# Patient Record
Sex: FEMALE | Race: WHITE | NOT HISPANIC OR LATINO | URBAN - METROPOLITAN AREA
[De-identification: names, ages, dates, MRNs, and addresses within clinical notes are randomized per-mention and may not be internally consistent; named-entity substitution may affect disease eponyms.]

---

## 2020-09-25 ENCOUNTER — EMERGENCY (EMERGENCY)
Facility: HOSPITAL | Age: 70
LOS: 0 days | Discharge: HOME | End: 2020-09-25
Payer: MEDICARE

## 2020-09-25 VITALS
RESPIRATION RATE: 17 BRPM | HEIGHT: 67 IN | HEART RATE: 85 BPM | DIASTOLIC BLOOD PRESSURE: 93 MMHG | WEIGHT: 169.98 LBS | TEMPERATURE: 98 F | OXYGEN SATURATION: 97 % | SYSTOLIC BLOOD PRESSURE: 129 MMHG

## 2020-09-25 DIAGNOSIS — M25.461 EFFUSION, RIGHT KNEE: ICD-10-CM

## 2020-09-25 DIAGNOSIS — Z88.0 ALLERGY STATUS TO PENICILLIN: ICD-10-CM

## 2020-09-25 DIAGNOSIS — M25.561 PAIN IN RIGHT KNEE: ICD-10-CM

## 2020-09-25 DIAGNOSIS — Z96.651 PRESENCE OF RIGHT ARTIFICIAL KNEE JOINT: ICD-10-CM

## 2020-09-25 LAB
ALBUMIN SERPL ELPH-MCNC: 4.2 G/DL — SIGNIFICANT CHANGE UP (ref 3.3–5)
ALP SERPL-CCNC: 68 U/L — SIGNIFICANT CHANGE UP (ref 40–120)
ALT FLD-CCNC: 23 U/L — SIGNIFICANT CHANGE UP (ref 12–78)
ANION GAP SERPL CALC-SCNC: 5 MMOL/L — SIGNIFICANT CHANGE UP (ref 5–17)
AST SERPL-CCNC: 30 U/L — SIGNIFICANT CHANGE UP (ref 15–37)
BASOPHILS # BLD AUTO: 0.04 K/UL — SIGNIFICANT CHANGE UP (ref 0–0.2)
BASOPHILS NFR BLD AUTO: 0.4 % — SIGNIFICANT CHANGE UP (ref 0–2)
BILIRUB SERPL-MCNC: 0.4 MG/DL — SIGNIFICANT CHANGE UP (ref 0.2–1.2)
BUN SERPL-MCNC: 14 MG/DL — SIGNIFICANT CHANGE UP (ref 7–23)
CALCIUM SERPL-MCNC: 9.9 MG/DL — SIGNIFICANT CHANGE UP (ref 8.5–10.1)
CHLORIDE SERPL-SCNC: 108 MMOL/L — SIGNIFICANT CHANGE UP (ref 96–108)
CO2 SERPL-SCNC: 28 MMOL/L — SIGNIFICANT CHANGE UP (ref 22–31)
CREAT SERPL-MCNC: 0.68 MG/DL — SIGNIFICANT CHANGE UP (ref 0.5–1.3)
CRP SERPL-MCNC: 0.22 MG/DL — SIGNIFICANT CHANGE UP (ref 0–0.4)
EOSINOPHIL # BLD AUTO: 0.13 K/UL — SIGNIFICANT CHANGE UP (ref 0–0.5)
EOSINOPHIL NFR BLD AUTO: 1.4 % — SIGNIFICANT CHANGE UP (ref 0–6)
ERYTHROCYTE [SEDIMENTATION RATE] IN BLOOD: 7 MM/HR — SIGNIFICANT CHANGE UP (ref 0–20)
GLUCOSE SERPL-MCNC: 96 MG/DL — SIGNIFICANT CHANGE UP (ref 70–99)
GRAM STN FLD: SIGNIFICANT CHANGE UP
HCT VFR BLD CALC: 44.2 % — SIGNIFICANT CHANGE UP (ref 34.5–45)
HGB BLD-MCNC: 14.8 G/DL — SIGNIFICANT CHANGE UP (ref 11.5–15.5)
IMM GRANULOCYTES NFR BLD AUTO: 0.2 % — SIGNIFICANT CHANGE UP (ref 0–1.5)
LYMPHOCYTES # BLD AUTO: 1.7 K/UL — SIGNIFICANT CHANGE UP (ref 1–3.3)
LYMPHOCYTES # BLD AUTO: 18.9 % — SIGNIFICANT CHANGE UP (ref 13–44)
MCHC RBC-ENTMCNC: 30.8 PG — SIGNIFICANT CHANGE UP (ref 27–34)
MCHC RBC-ENTMCNC: 33.5 GM/DL — SIGNIFICANT CHANGE UP (ref 32–36)
MCV RBC AUTO: 92.1 FL — SIGNIFICANT CHANGE UP (ref 80–100)
MONOCYTES # BLD AUTO: 0.58 K/UL — SIGNIFICANT CHANGE UP (ref 0–0.9)
MONOCYTES NFR BLD AUTO: 6.5 % — SIGNIFICANT CHANGE UP (ref 2–14)
NEUTROPHILS # BLD AUTO: 6.51 K/UL — SIGNIFICANT CHANGE UP (ref 1.8–7.4)
NEUTROPHILS NFR BLD AUTO: 72.6 % — SIGNIFICANT CHANGE UP (ref 43–77)
NRBC # BLD: 0 /100 WBCS — SIGNIFICANT CHANGE UP (ref 0–0)
PLATELET # BLD AUTO: 336 K/UL — SIGNIFICANT CHANGE UP (ref 150–400)
POTASSIUM SERPL-MCNC: 4.3 MMOL/L — SIGNIFICANT CHANGE UP (ref 3.5–5.3)
POTASSIUM SERPL-SCNC: 4.3 MMOL/L — SIGNIFICANT CHANGE UP (ref 3.5–5.3)
PROT SERPL-MCNC: 8.2 GM/DL — SIGNIFICANT CHANGE UP (ref 6–8.3)
RBC # BLD: 4.8 M/UL — SIGNIFICANT CHANGE UP (ref 3.8–5.2)
RBC # FLD: 12.9 % — SIGNIFICANT CHANGE UP (ref 10.3–14.5)
SODIUM SERPL-SCNC: 141 MMOL/L — SIGNIFICANT CHANGE UP (ref 135–145)
SPECIMEN SOURCE: SIGNIFICANT CHANGE UP
WBC # BLD: 8.98 K/UL — SIGNIFICANT CHANGE UP (ref 3.8–10.5)
WBC # FLD AUTO: 8.98 K/UL — SIGNIFICANT CHANGE UP (ref 3.8–10.5)

## 2020-09-25 PROCEDURE — 99284 EMERGENCY DEPT VISIT MOD MDM: CPT

## 2020-09-25 PROCEDURE — 73562 X-RAY EXAM OF KNEE 3: CPT | Mod: 26,RT

## 2020-09-25 PROCEDURE — 73700 CT LOWER EXTREMITY W/O DYE: CPT | Mod: 26,RT

## 2020-09-25 RX ORDER — KETOROLAC TROMETHAMINE 30 MG/ML
15 SYRINGE (ML) INJECTION ONCE
Refills: 0 | Status: DISCONTINUED | OUTPATIENT
Start: 2020-09-25 | End: 2020-09-25

## 2020-09-25 RX ORDER — DIAZEPAM 5 MG
2 TABLET ORAL ONCE
Refills: 0 | Status: DISCONTINUED | OUTPATIENT
Start: 2020-09-25 | End: 2020-09-25

## 2020-09-25 RX ADMIN — Medication 2 MILLIGRAM(S): at 16:43

## 2020-09-25 RX ADMIN — Medication 15 MILLIGRAM(S): at 15:05

## 2020-09-25 RX ADMIN — Medication 15 MILLIGRAM(S): at 16:44

## 2020-09-25 NOTE — CONSULT NOTE ADULT - ASSESSMENT
70F with severe R knee pain s/p R TKA 3 years ago with Dr Jimenez, likely secondary to a traumatic effusion     -FU aspiration fluid for cell count, culture, gram stain, and crystals   -ESR: 7, FU CRP   -Pain control  -Ace wrap for comfort   -WBAT of the RLE  -Will discuss with Dr Ga and advise if plan changes     INCOMPLETE******** 70F with severe R knee pain s/p R TKA 3 years ago with Dr Jimenez, likely secondary to a traumatic effusion     -FU aspiration fluid for cell count, culture, gram stain, and crystals   -ESR: 7, CRP 0.22 ----- indicates low suspicion for infected total knee joint   -Pain control  -Ace wrap for comfort   -WBAT of the RLE  -We will monitor aspiration labs for evidence of infection and contact the patient if such labs result   -Orthopaedically stable for discharge  -Follow up w/ Dr. Mao as planned on October 2nd  -Discharge planning  -All patient's questions answered. Patient understands and agrees w/ above plan.  -Discussed with Dr Ga who agrees with the plan

## 2020-09-25 NOTE — ED PROVIDER NOTE - CLINICAL SUMMARY MEDICAL DECISION MAKING FREE TEXT BOX
69 yo  female with h/o right total knee replacement (2 yrs ago) presents to the ED c/o right knee pain intermittent since surgery however pain significantly worse since yesterday. Surgery was done by DR. Abel (2 yrs ago). A/P: knee xray, labs/esr/crp, ortho consult, nsaids for pain, reassess. 69 yo  female with h/o right total knee replacement (2 yrs ago) presents to the ED c/o right knee pain intermittent since surgery however pain significantly worse since yesterday. Surgery was done by DR. Abel (2 yrs ago). A/P: knee xray, labs/esr/crp, ortho consult, nsaids for pain, reassess. Cleared by ortho for discharge. Will follow up with ortho outpatient. Pain improved after joint aspiration, patient ambulating with knee immobilizer.

## 2020-09-25 NOTE — ED ADULT TRIAGE NOTE - CHIEF COMPLAINT QUOTE
69 y/o of female with PMH of right knee implant in 2017. Presents to the ed with chronic right knee swelling, pain and difficulty walking. The pain radiates down the foot area.

## 2020-09-25 NOTE — ED ADULT NURSE NOTE - CHIEF COMPLAINT QUOTE
71 y/o of female with PMH of right knee implant in 2017. Presents to the ed with chronic right knee swelling, pain and difficulty walking. The pain radiates down the foot area.

## 2020-09-25 NOTE — ED PROVIDER NOTE - PATIENT PORTAL LINK FT
You can access the FollowMyHealth Patient Portal offered by [facility] by registering at the following website: [email or web address]. By joining [facility]’s FollowMyHealth portal, you will also be able to view your health information using other applications (apps) compatible with our system.

## 2020-09-25 NOTE — ED PROVIDER NOTE - CARE PROVIDER_API CALL
Howard Mao  ORTHOPAEDIC SURGERY  [address], [address] [place] [address]  Phone: [phone]  Fax: [phone]  Follow Up Time: 1-3 Days

## 2020-09-25 NOTE — CONSULT NOTE ADULT - SUBJECTIVE AND OBJECTIVE BOX
70yFemale c/o R kneee pain for 2 years duration follow a TKA with Dr Jimenez, however the pain has severely worsen in the last couple days. Patient states that they cannot walk because of pain. Patient states she has some chronic numbness and tingling over the lateral aspect of the thigh and knee. Otherwise patient denies numbness or tingling in the RLE. Patient denies hx of trauma. Patient denies fever/chills. She was a patient of Dr Crouch however has an appointment to start care with Dr Mao on the 2nd of October.     PAST MEDICAL & SURGICAL HISTORY:  No pertinent past medical history      Vital Signs Last 24 Hrs  T(C): 36.6 (25 Sep 2020 12:11), Max: 36.6 (25 Sep 2020 12:11)  T(F): 97.9 (25 Sep 2020 12:11), Max: 97.9 (25 Sep 2020 12:11)  HR: 85 (25 Sep 2020 12:11) (85 - 85)  BP: 129/93 (25 Sep 2020 12:11) (129/93 - 129/93)  BP(mean): --  RR: 17 (25 Sep 2020 12:11) (17 - 17)  SpO2: 97% (25 Sep 2020 12:11) (97% - 97%)    Gen: NAD    PE RLE:  Skin intact,   +warmth of knee and effusion, no erythema   SILT L3-S1  +EHL/FHL/TA/Gastroc,   +hip/ankle ROM,   knee ROM limited 2/2 pain, patient is able to A/PROM of the knee about 15 degrees before pain is too severe  DP+, soft compartments, no calf ttp.    Secondary Survey:   No TTP over bony prominences, SILT, palpable pulses, full/painless A/PROM, compartments soft. No TTP over spinous processes or paraspinal Procedure:  The benefits and risks of joint aspiration were explained to the patient, whom agreed to proceed with aspiration. Under aspetic conditions, ___ cc of <DESCRIPTION OF FLUID> fluid was removed from the affected joint. This fluid was distributed to a sterile urine cup for gram stain and cell culture, a lavender top laboratory tube for cell count, and a red top laboratory tube for crystal analysis. The patient tolerated the procedure well and there were no complications. Patient was neurovascularly intact after the procedure.les at C/T/L spine. No palpable step off. No other injuries or complaints.    Imaging:  XR R knee demonstrating no acute fracture or dislocation of the TKA. Appears to be in good alignment and no obvious loosening is present  CT of the Right Knee demonstrates a large effusion of the knee joint     Procedure:  The benefits and risks of joint aspiration were explained to the patient, whom agreed to proceed with aspiration. Under aspetic conditions, 80 cc of bloody fluid was removed from the affected joint. This fluid was distributed to a sterile urine cup for gram stain and cell culture, a lavender top laboratory tube for cell count, and a red top laboratory tube for crystal analysis. The patient tolerated the procedure well and there were no complications. Patient was neurovascularly intact after the procedure.   70yFemale c/o R knee pain for 2 years duration follow a TKA with Dr Jimenez, however the pain has severely worsened in the last couple days. Patient states that they cannot walk because of pain. Patient states she has some chronic numbness and tingling over the lateral aspect of the thigh and knee. Otherwise patient denies numbness or tingling in the RLE. Patient denies hx of trauma. Patient denies fever/chills. Pt states she sometimes feels like her knee "bends backwards" when she walks and this has happened on multiple occasions. She was a patient of Dr Crouch however has an appointment to start care with Dr Mao on the 2nd of October.     PAST MEDICAL & SURGICAL HISTORY:  No pertinent past medical history  R TKA in 2018 by Dr. Jimenez      Vital Signs Last 24 Hrs  T(C): 36.6 (25 Sep 2020 12:11), Max: 36.6 (25 Sep 2020 12:11)  T(F): 97.9 (25 Sep 2020 12:11), Max: 97.9 (25 Sep 2020 12:11)  HR: 85 (25 Sep 2020 12:11) (85 - 85)  BP: 129/93 (25 Sep 2020 12:11) (129/93 - 129/93)  BP(mean): --  RR: 17 (25 Sep 2020 12:11) (17 - 17)  SpO2: 97% (25 Sep 2020 12:11) (97% - 97%)    Gen: NAD    PE RLE:  Skin intact,   +warmth of knee and effusion, no erythema   SILT L3-S1  +EHL/FHL/TA/Gastroc,   +hip/ankle ROM,   knee ROM limited 2/2 pain, patient is able to A/PROM of the knee about 15 degrees before pain is too severe  DP+, soft compartments, no calf ttp.    Secondary Survey:   No TTP over bony prominences, SILT, palpable pulses, full/painless A/PROM, compartments soft. No TTP over spinous processes or paraspinal muscles at C/T/L spine. No palpable step off. No other injuries or complaints.    Imaging:  XR R knee demonstrating no acute fracture or dislocation of the TKA. Appears to be in good alignment and no obvious loosening is present  CT of the Right Knee demonstrates a large effusion of the knee joint     Procedure:  The benefits and risks of joint aspiration were explained to the patient, whom agreed to proceed with aspiration. Under aseptic conditions, 80 cc of bloody fluid was removed from the affected joint. This fluid was distributed to a sterile urine cup for gram stain and cell culture, a lavender top laboratory tube for cell count, and a red top laboratory tube for crystal analysis. The patient tolerated the procedure well and there were no complications. The knee was dressed with 4x4s and ACE. Patient was neurovascularly intact after the procedure.

## 2020-09-25 NOTE — ED PROVIDER NOTE - OBJECTIVE STATEMENT
71 yo  female with h/o right total knee replacement (2 yrs ago) presents to the ED c/o right knee pain intermittent since surgery however pain significantly worse since yesterday. Surgery was done by DR. Abel (2 yrs ago). however patient has appointment with new orthopedists, DR. Knott on Oct 2nd for knee pain. No fall or trauma. Patient unable to put weight on knee due to pain. Denies fever, chills, chest pain, sob, abd pain, N/V, LE weakness or paresthesias. no h/o DM.

## 2020-09-25 NOTE — ED PROVIDER NOTE - PROGRESS NOTE DETAILS
Patient evaluated by ortho, joint aspiration performed - sent to lab (pending results bc specimen was sent to [facility]). Cleared by ortho for discharge, low concern for septic joint given normal esr and crp. Will follow up with outpatient ortho.

## 2020-09-25 NOTE — ED PROVIDER NOTE - SKIN, MLM
Skin normal color for race, warm, dry and intact. No evidence of rash. no erythema  or rash to right knee. + mild increased warmth to knee. cap refill < 2 sec

## 2020-09-25 NOTE — ED PROVIDER NOTE - MUSCULOSKELETAL MINIMAL EXAM
+ TTP right anterior knee joint with effusion, no erythema. + mild increased warmth. Decreased ROM of right knee due to pain. dp pulses equal and intact bilaterally.

## 2020-09-25 NOTE — ED ADULT NURSE NOTE - OBJECTIVE STATEMENT
Pt is a 70YOF who is here with pain in her right knee, pt has some edema, no redness noted, no warmth to the site, pt has limited ROM/PROM, pt is not ambulatory, pt has pain in the site and states that she had a total knee replacement in 2018 on that knee. Pt states she has had pain in her knee since. Pt states that she has had no falls, no trauma and recent injury.

## 2020-09-25 NOTE — ED PROVIDER NOTE - NSFOLLOWUPINSTRUCTIONS_ED_ALL_ED_FT

## 2020-09-26 LAB
B PERT IGG+IGM PNL SER: ABNORMAL
COLOR FLD: SIGNIFICANT CHANGE UP
EOSINOPHIL # FLD: 5 % — SIGNIFICANT CHANGE UP
FLUID INTAKE SUBSTANCE CLASS: SIGNIFICANT CHANGE UP
FLUID SEGMENTED GRANULOCYTES: 37 % — SIGNIFICANT CHANGE UP
LYMPHOCYTES # FLD: 31 % — SIGNIFICANT CHANGE UP
MESOTHL CELL # FLD: 1 % — SIGNIFICANT CHANGE UP
MONOS+MACROS # FLD: 26 % — SIGNIFICANT CHANGE UP
RCV VOL RI: HIGH /UL (ref 0–0)
SYNOVIAL CRYSTALS CLARITY: ABNORMAL
SYNOVIAL CRYSTALS COLOR: ABNORMAL
SYNOVIAL CRYSTALS ID: SIGNIFICANT CHANGE UP
SYNOVIAL CRYSTALS TUBE: SIGNIFICANT CHANGE UP
TOTAL NUCLEATED CELL COUNT, BODY FLUID: 4400 /UL — SIGNIFICANT CHANGE UP
TUBE TYPE: SIGNIFICANT CHANGE UP

## 2020-09-28 DIAGNOSIS — M25.561 PAIN IN RIGHT KNEE: ICD-10-CM

## 2020-09-28 PROBLEM — Z00.00 ENCOUNTER FOR PREVENTIVE HEALTH EXAMINATION: Status: ACTIVE | Noted: 2020-09-28

## 2020-10-02 ENCOUNTER — APPOINTMENT (OUTPATIENT)
Dept: ORTHOPEDIC SURGERY | Facility: CLINIC | Age: 70
End: 2020-10-02
Payer: MEDICARE

## 2020-10-02 VITALS — BODY MASS INDEX: 31.28 KG/M2 | HEIGHT: 62 IN | WEIGHT: 170 LBS

## 2020-10-02 DIAGNOSIS — M25.461 EFFUSION, RIGHT KNEE: ICD-10-CM

## 2020-10-02 PROCEDURE — 73562 X-RAY EXAM OF KNEE 3: CPT | Mod: RT

## 2020-10-02 PROCEDURE — 20610 DRAIN/INJ JOINT/BURSA W/O US: CPT | Mod: RT

## 2020-10-02 PROCEDURE — 99204 OFFICE O/P NEW MOD 45 MIN: CPT | Mod: 25

## 2020-10-02 PROCEDURE — 73560 X-RAY EXAM OF KNEE 1 OR 2: CPT | Mod: LT

## 2020-10-02 NOTE — ADDENDUM
[FreeTextEntry1] : This note was written by Tom Oakley on 10/02/2020 acting as scribe for Dr. Howard Mao M.D.\par \par I, Dr. Howard Mao, have read and attest that all the information, medical decision making and discharge instructions within are true and accurate.

## 2020-10-02 NOTE — PHYSICAL EXAM
[de-identified] : General appearance: well nourished and hydrated, pleasant, alert and oriented x 3, cooperative.\par HEENT: Normocephalic, EOM intact, Nasal septum midline, Oral cavity clear, External auditory canal clear.\par Cardiovascular: no apparent abnormalities, no lower leg edema, no varicosities, pedal pulses are palpable.\par Lymphatics Lymph nodes: none palpated, Lymphedema: not present.\par Neurologic: sensation is normal, no muscle weakness in upper or lower extremities, patella tendon reflexes intact .\par Dermatologic no apparent skin lesions, moist, warm, no rash.\par Spine:cervical spine appears normal and moves freely, thoracic spine appears normal and moves freely, lumbosacral spine appears normal and moves freely.\par Gait: right antalgic.\par \par Left knee\par Inspection: trace effusion \par Wounds: none.\par Alignment: normal.\par Palpation: no specific tenderness on palpation.\par ROM active (in degrees): 0-120 with crepitus \par Ligamentous laxity: all ligaments appear stable,, negative ant. drawer test, negative post. drawer test, stable to varus stress test, stable to valgus stress test. negative Lachman's test, negative pivot shift test\par Meniscal Test: negative McMurrays, negative Aliza.\par Patellofemoral Alignment Test: Q angle-, normal.\par Muscle Test: good quad strength.\par \par Right knee\par Inspection: mild effusion \par Wounds: none.\par Alignment: normal, increased AP translation >10mm \par Palpation: medial and lateral tenderness on palpation, especially over the pes tendon.\par ROM active (in degrees): 0-115 with clicking through the arc of motion \par Ligamentous laxity: all ligaments appear stable,, negative ant. drawer test, negative post. drawer test, stable to varus stress test, stable to valgus stress test. negative Lachman's test, negative pivot shift test\par Meniscal Test: negative McMurrays, negative Aliza.\par Patellofemoral Alignment Test: Q angle-, normal.\par Muscle Test: good quad strength.\par \par Left hip\par Inspection: No swelling or ecchymosis.\par Wounds: none.\par Palpation: non-tender.\par Stability: no instability.\par Strength: 5/5 all motor groups.\par ROM: no pain with FROM.\par Leg length: equal.\par \par Right hip\par Inspection: No swelling or ecchymosis.\par Wounds: none.\par Palpation: non-tender.\par Stability: no instability.\par Strength: 5/5 all motor groups.\par ROM: no pain with FROM.\par Leg length: equal.\par \par Left ankle\par Inspection: no erythema noted, no swelling noted.\par Palpation: no pain on palpation .\par ROM: FROM without crepitus.\par Muscle strength: 5/5.\par Stability: no instability noted.\par \par Right ankle\par Inspection: no erythema noted, no swelling noted.\par ROM: FROM without crepitus.\par Palpation: no pain on palpation .\par Muscle strength: 5/5.\par Stability: no instability noted.\par \par Left foot\par Inspection: color, texture and turgor are normal.\par ROM: full range of motion of all joints without pain or crepitus.\par Palpation: no tenderness.\par Stability: no instability noted.\par \par Right foot\par Inspection: color, texture and turgor are normal.\par ROM: full range of motion of all joints without pain or crepitus.\par Palpation: no tenderness.\par Stability: no instability noted.\par \par Left shoulder\par Inspection: no muscle asymmetry, no atrophy.\par Palpation: no tenderness noted, ACJ non-tender.\par ROM: limited ROM\par Strength testing): anterior deltoid, supraspinatus, infraspinatus, subscapularis all 5/5.\par Stability test: ant. apprehension negative, post. apprehension negative, relocation test negative.\par Impingement Test: negative NEER.\par \par Right shoulder\par Inspection: no muscle asymmetry, no atrophy.\par Palpation: no tenderness noted, ACJ non-tender.\par ROM: full active ROM, full passive ROM.\par Strength testing): anterior deltoid, supraspinatus, infraspinatus, subscapularis all 5/5.\par Stability test: ant. apprehension negative, post. apprehension negative, relocation test negative.\par Impingement Test: negative NEER.\par Surgical Wounds: none.\par \par Left elbow\par Inspection: negative swelling.\par Wounds: none.\par Palpation: non-tender.\par ROM: full ROM.\par Strength: 5/5 all groups.\par Stability: no instability.\par Mass: none.\par \par Right elbow\par Inspection: negative swelling.\par Wounds: none.\par Palpation: non-tender.\par ROM: full ROM.\par Strength: 5/5 all groups.\par Stability: no instability.\par Mass: none.\par \par Left wrist\par Inspection: negative swelling.\par Wound: none.\par Palpation (bone): no tenderness.\par ROM: full ROM.\par Strength: full , good.\par \par Right wrist\par Inspection: negative swelling.\par Wound: none.\par Palpation (bone): no tenderness.\par ROM: full ROM.\par Strength: full , good.\par \par Left hand\par Inspection: no skin changes, normal appearance.\par Wounds: none.\par Strength: full , able to make full fist.\par Sensation: light touch intact all fingers and thumb.\par Vascular: good capillary refill < 3 seconds, all fingers and thumb.\par Mass: none.\par \par Right hand\par Inspection: no skin changes, normal appearance.\par Wounds: none.\par Strength: full , able to make full fist.\par Sensation: light touch intact all fingers and thumb.\par Vascular: good capillary refill < 3 seconds, all fingers and thumb.\par Mass: none.  [de-identified] : Right knee xrays, standing AP/Lateral and Merchant films, and 45 degree PA standing view, taken at the office today shows a total knee replacement in satisfactory position and alignment. No evidence of loosening. The patella sits in a central position. Appears to be a conformis CR implant. \par \par Left knee xray merchant view taken at the office today demonstrates patella sits at an appropriate height in a central position, slight narrowing of the joint space, patellofemoral arthritis \par \par CT scan taken 09.25.2020: films brought in and reviewed, see attached report. I agree with radiologist report including films brought in and reviewed, see attached report.

## 2020-10-02 NOTE — HISTORY OF PRESENT ILLNESS
[de-identified] : 70 year old female presents for initial evaluation of right knee pain, s/p right TKA at 3 years. Patient denies any specific injury. She is here for a second opinion. Since her TKA 3 years ago, she has had difficulty ambulating, and continent pain. Patient states that when standing for long periods, her knee hyperextends, in which she then has to push it back to be straight. Patient is able to negotiate the stairs alternating normally, with significant pain. 1 week ago, she was in severe pain in which she went to the ER. They then withdrew blood fluid that relieved her pain. She endorses sharp pain, swelling, buckling, clicking and loss of motion. Her lab work performed at [facility] on 9/25/2020, showed a white count of 8.9, ESR of 7, CRP of 0.22, and a cell culture of synovial fluid showed no growth. However, the cell count was 4400 nucleates cells. Today she presents in the clinic for a second opinion.

## 2020-10-02 NOTE — DISCUSSION/SUMMARY
[de-identified] : Discussed at length the nature of the patient’s condition. Their right knee symptoms appear secondary to flexion instability with recurrent hemarthrosis. I reviewed x-ray films with them. I also reviewed blood test with her from the [facility] taken on 9/25/2020. \par \par We reviewed operative and nonoperative treatment. Discussed at length the nature of the failed total knee replacement and reviewed non-operative and operative treatment. Due to the pain and associated disability I recommended a RIGHT revision total knee replacement. The risks, benefits, convalescence and alternatives were reviewed. Numerous questions were asked and answered. Models were used as an educational tool. Surgery will be scheduled at a convenient time.We did discuss implant choice and fixation, with shared decision making with the patient. LCCK Prosthesis should be available. Preop medical clearance. \par \par The right knee was aspirated as described above. When results are available, we will discuss further. Prior aspiration on 9/25/2020 showed no growth, and her CBC, ESR, CRP, and D-DIMER were normal. Her likelihood for infection is remote.  \par \par She can continue activities as tolerated. All questions answered, understanding verbalized. Patient in agreement with plan of care.

## 2020-10-02 NOTE — PROCEDURE
[de-identified] : RIGHT KNEE ASPIRATION\par Discussed at length the procedure of a knee aspiration. The risks, benefits, convalescence and alternatives were reviewed. The possible side effects discussed included but were not limited to: pain, swelling, bleeding and infection. Following this discussion, the knee was prepped with betadine and under a sterile condition, an 18 gauge needle was inserted into the joint through a lateral approach just superior to the patella with the knee in an extended position. The fluid was aspirated and sent for evaluation. Upon withdrawal of the needle the site was cleaned with alcohol and a bandaid applied. The patient tolerated the aspiration well and there were no adverse effects. Post aspiration instructions included no strenuous activity for 24 hours, cryotherapy and if there are any adverse effects to contact the office.\par \par 25 cc of bloody synovial fluid were aspirated from the right knee.

## 2020-10-03 ENCOUNTER — LABORATORY RESULT (OUTPATIENT)
Age: 70
End: 2020-10-03

## 2020-10-05 LAB
B PERT IGG+IGM PNL SER: ABNORMAL
COLOR FLD: NORMAL
EOSINOPHIL # FLD MANUAL: 4 %
FLUID INTAKE SUBSTANCE CLASS: NORMAL
LYMPHOCYTES # FLD MANUAL: 16 %
MESOTHL CELL NFR FLD: 0 %
MONOS+MACROS NFR FLD MANUAL: 43 %
NEUTS SEG # FLD MANUAL: 37 %
NRBC # FLD: 0
RBC # FLD MANUAL: ABNORMAL /UL
SYCRY CLARITY: ABNORMAL
SYCRY COLOR: ABNORMAL
SYCRY ID: NORMAL
SYCRY TUBE: NORMAL
TOTAL CELLS COUNTED FLD: 59 /UL
TUBE TYPE: NORMAL
UNIDENT CELLS NFR FLD MANUAL: 0 %
VARIANT LYMPHS # FLD MANUAL: 0 %

## 2020-10-08 PROBLEM — Z78.9 OTHER SPECIFIED HEALTH STATUS: Chronic | Status: ACTIVE | Noted: 2020-09-25

## 2020-10-09 LAB
CULTURE RESULTS: SIGNIFICANT CHANGE UP
GRAM STN FLD: SIGNIFICANT CHANGE UP
SPECIMEN SOURCE: SIGNIFICANT CHANGE UP

## 2020-10-19 LAB — BACTERIA FLD CULT: NORMAL

## 2020-10-28 ENCOUNTER — OUTPATIENT (OUTPATIENT)
Dept: OUTPATIENT SERVICES | Facility: HOSPITAL | Age: 70
LOS: 1 days | Discharge: HOME | End: 2020-10-28
Payer: MEDICARE

## 2020-10-28 VITALS
OXYGEN SATURATION: 98 % | SYSTOLIC BLOOD PRESSURE: 122 MMHG | TEMPERATURE: 98 F | WEIGHT: 175.05 LBS | RESPIRATION RATE: 18 BRPM | HEIGHT: 67 IN | DIASTOLIC BLOOD PRESSURE: 78 MMHG | HEART RATE: 77 BPM

## 2020-10-28 DIAGNOSIS — T84.098A OTHER MECHANICAL COMPLICATION OF OTHER INTERNAL JOINT PROSTHESIS, INITIAL ENCOUNTER: ICD-10-CM

## 2020-10-28 DIAGNOSIS — Z98.890 OTHER SPECIFIED POSTPROCEDURAL STATES: Chronic | ICD-10-CM

## 2020-10-28 DIAGNOSIS — Z96.651 PRESENCE OF RIGHT ARTIFICIAL KNEE JOINT: Chronic | ICD-10-CM

## 2020-10-28 DIAGNOSIS — Z96.652 PRESENCE OF LEFT ARTIFICIAL KNEE JOINT: Chronic | ICD-10-CM

## 2020-10-28 DIAGNOSIS — Z87.39 PERSONAL HISTORY OF OTHER DISEASES OF THE MUSCULOSKELETAL SYSTEM AND CONNECTIVE TISSUE: Chronic | ICD-10-CM

## 2020-10-28 DIAGNOSIS — Z01.818 ENCOUNTER FOR OTHER PREPROCEDURAL EXAMINATION: ICD-10-CM

## 2020-10-28 LAB
A1C WITH ESTIMATED AVERAGE GLUCOSE RESULT: 5.5 % — SIGNIFICANT CHANGE UP (ref 4–5.6)
ANION GAP SERPL CALC-SCNC: 6 MMOL/L — SIGNIFICANT CHANGE UP (ref 5–17)
APPEARANCE UR: CLEAR — SIGNIFICANT CHANGE UP
APTT BLD: 35.5 SEC — SIGNIFICANT CHANGE UP (ref 27.5–35.5)
BILIRUB UR-MCNC: NEGATIVE — SIGNIFICANT CHANGE UP
BLD GP AB SCN SERPL QL: SIGNIFICANT CHANGE UP
BUN SERPL-MCNC: 19 MG/DL — SIGNIFICANT CHANGE UP (ref 7–23)
CALCIUM SERPL-MCNC: 8.9 MG/DL — SIGNIFICANT CHANGE UP (ref 8.5–10.1)
CHLORIDE SERPL-SCNC: 104 MMOL/L — SIGNIFICANT CHANGE UP (ref 96–108)
CO2 SERPL-SCNC: 28 MMOL/L — SIGNIFICANT CHANGE UP (ref 22–31)
COLOR SPEC: YELLOW — SIGNIFICANT CHANGE UP
CREAT SERPL-MCNC: 0.6 MG/DL — SIGNIFICANT CHANGE UP (ref 0.5–1.3)
DIFF PNL FLD: NEGATIVE — SIGNIFICANT CHANGE UP
ESTIMATED AVERAGE GLUCOSE: 111 MG/DL — SIGNIFICANT CHANGE UP (ref 68–114)
GLUCOSE SERPL-MCNC: 89 MG/DL — SIGNIFICANT CHANGE UP (ref 70–99)
GLUCOSE UR QL: NEGATIVE MG/DL — SIGNIFICANT CHANGE UP
HCT VFR BLD CALC: 41.2 % — SIGNIFICANT CHANGE UP (ref 34.5–45)
HGB BLD-MCNC: 13.4 G/DL — SIGNIFICANT CHANGE UP (ref 11.5–15.5)
INR BLD: 0.95 RATIO — SIGNIFICANT CHANGE UP (ref 0.88–1.16)
KETONES UR-MCNC: NEGATIVE — SIGNIFICANT CHANGE UP
LEUKOCYTE ESTERASE UR-ACNC: NEGATIVE — SIGNIFICANT CHANGE UP
MCHC RBC-ENTMCNC: 30.9 PG — SIGNIFICANT CHANGE UP (ref 27–34)
MCHC RBC-ENTMCNC: 32.5 GM/DL — SIGNIFICANT CHANGE UP (ref 32–36)
MCV RBC AUTO: 94.9 FL — SIGNIFICANT CHANGE UP (ref 80–100)
NITRITE UR-MCNC: NEGATIVE — SIGNIFICANT CHANGE UP
NRBC # BLD: 0 /100 WBCS — SIGNIFICANT CHANGE UP (ref 0–0)
PH UR: 6.5 — SIGNIFICANT CHANGE UP (ref 5–8)
PLATELET # BLD AUTO: 289 K/UL — SIGNIFICANT CHANGE UP (ref 150–400)
POTASSIUM SERPL-MCNC: 4.3 MMOL/L — SIGNIFICANT CHANGE UP (ref 3.5–5.3)
POTASSIUM SERPL-SCNC: 4.3 MMOL/L — SIGNIFICANT CHANGE UP (ref 3.5–5.3)
PROT UR-MCNC: NEGATIVE MG/DL — SIGNIFICANT CHANGE UP
PROTHROM AB SERPL-ACNC: 11 SEC — SIGNIFICANT CHANGE UP (ref 10.6–13.6)
RBC # BLD: 4.34 M/UL — SIGNIFICANT CHANGE UP (ref 3.8–5.2)
RBC # FLD: 13 % — SIGNIFICANT CHANGE UP (ref 10.3–14.5)
SODIUM SERPL-SCNC: 138 MMOL/L — SIGNIFICANT CHANGE UP (ref 135–145)
SP GR SPEC: 1.01 — SIGNIFICANT CHANGE UP (ref 1.01–1.02)
UROBILINOGEN FLD QL: NEGATIVE MG/DL — SIGNIFICANT CHANGE UP
WBC # BLD: 6.26 K/UL — SIGNIFICANT CHANGE UP (ref 3.8–10.5)
WBC # FLD AUTO: 6.26 K/UL — SIGNIFICANT CHANGE UP (ref 3.8–10.5)

## 2020-10-28 PROCEDURE — 93010 ELECTROCARDIOGRAM REPORT: CPT

## 2020-10-28 NOTE — PHYSICAL THERAPY INITIAL EVALUATION ADULT - ADDITIONAL COMMENTS
Patient endorses typical pain at best is 4/10, at worst is 10/10. Per patient, pain is worse with movement and prolonged sitting/standing. Pain does not take medications or use any ice/heat for pain. Home set-up: lives with  in private house with 3 stair steps to enter without handrails to enter at front. Patient plans to stay at main level of house during recovery. Bathroom is a walk in shower with grab bars, with a comfort height toilet seat, with both fixed & retractable shower heads. Endorses will be supported by  post-op. Patient is currently independent with mobility. Reports owns a rolling walker and commode. Patient is right-handed and drives; wears glasses always. Patient denies falls in past 6 months. Denies buckling.

## 2020-10-28 NOTE — PHYSICAL THERAPY INITIAL EVALUATION ADULT - GENERAL OBSERVATIONS, REHAB EVAL
Patient encountered sitting in PST waiting area, agreeable to PT pre-op evaluation. Patient is for elective revision of right total knee arthroplasty at a later date from now.

## 2020-10-28 NOTE — PHYSICAL THERAPY INITIAL EVALUATION ADULT - MODIFIED CLINICAL TEST OF SENSORY INTEGRATION IN BALANCE TEST
30 Seconds Sit to Stand c 8 repetitions, indicating impaired functional strength to lower limbs; One-Leg Stand Test = 21 secs.

## 2020-10-28 NOTE — PHYSICAL THERAPY INITIAL EVALUATION ADULT - DISCHARGE DISPOSITION, PT EVAL
Home c home PT c axillary crutches, pending functional status post-operatively. Patient reports owns rolling walker, 3:1 commode from previous surgery.

## 2020-10-28 NOTE — H&P PST ADULT - NSICDXPROBLEM_GEN_ALL_CORE_FT
PROBLEM DIAGNOSES  Problem: Other mechanical complication of other internal joint prosthesis, initial encounter  Assessment and Plan: Right revision total knee replacement     Problem: Preop examination  Assessment and Plan: labs - cbc,pt/ptt,bmp,t&s,nose cx,ekg  M/C required  preop 3 day hibiclens instruction reviewed and given .instructed on if  nose cx positive use mupuricin 5 days and checklist given  take routine meds DOS with sips of water. avoid NSAID and OTC supplements. verbalized understanding  information on proper nutrition , increase protein and better food choices provided in packet

## 2020-10-28 NOTE — H&P PST ADULT - NSICDXPASTSURGICALHX_GEN_ALL_CORE_FT
PAST SURGICAL HISTORY:  H/O arthroscopy of knee x3 right    H/O arthroscopy of shoulder left shoulder x2    H/O foot surgery right foot (neuroma)    H/O thumb surgery     H/O total knee replacement, right 2016    History of skin surgery lip    History of trigger finger

## 2020-10-28 NOTE — H&P PST ADULT - HISTORY OF PRESENT ILLNESS
70 year old female with no significant past medical history underwent a right knee replacement 4 years ago and reports unrelieved pain, swelling and limited ROM to right knee. She is scheduled for a right revision total knee replacement on 11/12/2020.     She denies fever, cough, SOB, recent travels, and sick contacts.     Goal: To walk without pain

## 2020-10-28 NOTE — OCCUPATIONAL THERAPY INITIAL EVALUATION ADULT - PERSONAL SAFETY AND JUDGMENT, REHAB EVAL
[Normal Growth] : growth [Normal Development] : developmental [None] : No known medical problems [No Elimination Concerns] : elimination [No Feeding Concerns] : feeding [No Skin Concerns] : skin [Normal Sleep Pattern] : sleep [ Transition] :  transition [ Care] :  care [Nutritional Adequacy] : nutritional adequacy [Parental Well-Being] : parental well-being [Safety] : safety [No Medications] : ~He/She~ is not on any medications [Mother] : mother [FreeTextEntry1] : discussed the schedule , feeding guide, precautions-next exam is one month and wt check in one week intact

## 2020-10-28 NOTE — OCCUPATIONAL THERAPY INITIAL EVALUATION ADULT - ADDITIONAL COMMENTS
Patient lives with  (Who can assist post op) in a private house with 3 steps with no handrails. Once inside, the patient main bedroom and bathroom is on that floor when entering. The patients bathroom has a walk in shower stall, fixed/retractable shower head, regular toilet seat and has grab bars in the tub/shower. The patient reports that a 3/1 commode can fit over the toilet at home. The patient ambulates with no device and owns a rolling walker and a commode. The patient daily pain is a 4/10 at rest and a 10/10 with movement. The patient does not take anything to manage the pain. The patient has no recent falls, no recent outpatient PT and no buckling of the knees noted. The patient wears glasses all the time, R handed, drives and has no hearing impairments. The patient had her original knee replacement done 4 years ago.

## 2020-10-28 NOTE — PHYSICAL THERAPY INITIAL EVALUATION ADULT - PERTINENT HX OF CURRENT PROBLEM, REHAB EVAL
Patient attends Pre-Op Testing today following consult with Dr. Mao due to chronic pain to right knee. Significant surgical/medical histories of Right total knee replacement 4 years ago. Elective revision RTKR is now scheduled in this facility for 11/12/20.

## 2020-10-28 NOTE — H&P PST ADULT - ASSESSMENT
70 year old female with no significant past medical history underwent a right knee replacement 4 years ago and reports unrelieved pain, swelling and limited ROM to right knee. She is scheduled for a right revision total knee replacement on 2020.     CAPRINI SCORE [CLOT]    AGE RELATED RISK FACTORS                                                       MOBILITY RELATED FACTORS  [ ] Age 41-60 years                                            (1 Point)                  [ ] Bed rest                                                        (1 Point)  [x ] Age: 61-74 years                                           (2 Points)                 [ ] Plaster cast                                                   (2 Points)  [ ] Age= 75 years                                              (3 Points)                 [ ] Bed bound for more than 72 hours                 (2 Points)    DISEASE RELATED RISK FACTORS                                               GENDER SPECIFIC FACTORS  [x ] Edema in the lower extremities                       (1 Point)                  [ ] Pregnancy                                                     (1 Point)  [ ] Varicose veins                                               (1 Point)                  [ ] Post-partum < 6 weeks                                   (1 Point)             [x ] BMI > 25 Kg/m2                                            (1 Point)                  [ ] Hormonal therapy  or oral contraception          (1 Point)                 [ ] Sepsis (in the previous month)                        (1 Point)                  [ ] History of pregnancy complications                 (1 point)  [ ] Pneumonia or serious lung disease                                               [ ] Unexplained or recurrent                     (1 Point)           (in the previous month)                               (1 Point)  [ ] Abnormal pulmonary function test                     (1 Point)                 SURGERY RELATED RISK FACTORS  [ ] Acute myocardial infarction                              (1 Point)                 [ ]  Section                                             (1 Point)  [ ] Congestive heart failure (in the previous month)  (1 Point)               [ ] Minor surgery                                                  (1 Point)   [ ] Inflammatory bowel disease                             (1 Point)                 [ ] Arthroscopic surgery                                        (2 Points)  [ ] Central venous access                                      (2 Points)                [ ] General surgery lasting more than 45 minutes   (2 Points)       [ ] Stroke (in the previous month)                          (5 Points)               [ x] Elective arthroplasty                                         (5 Points)                                                                                                                                               HEMATOLOGY RELATED FACTORS                                                 TRAUMA RELATED RISK FACTORS  [ ] Prior episodes of VTE                                     (3 Points)                [ ] Fracture of the hip, pelvis, or leg                       (5 Points)  [ ] Positive family history for VTE                         (3 Points)                 [ ] Acute spinal cord injury (in the previous month)  (5 Points)  [ ] Prothrombin 07113 A                                     (3 Points)                 [ ] Paralysis  (less than 1 month)                             (5 Points)  [ ] Factor V Leiden                                             (3 Points)                  [ ] Multiple Trauma within 1 month                        (5 Points)  [ ] Lupus anticoagulants                                     (3 Points)                                                           [ ] Anticardiolipin antibodies                               (3 Points)                                                       [ ] High homocysteine in the blood                      (3 Points)                                             [ ] Other congenital or acquired thrombophilia      (3 Points)                                                [ ] Heparin induced thrombocytopenia                  (3 Points)                                          Total Score [      9    ]    Caprini Score 0 - 2:  Low Risk, No VTE Prophylaxis required for most patients, encourage ambulation  Caprini Score 3 - 6:  At Risk, pharmacologic VTE prophylaxis is indicated for most patients (in the absence of a contraindication)  Caprini Score Greater than or = 7:  High Risk, pharmacologic VTE prophylaxis is indicated for most patients (in the absence of a contraindication)    Caprini score indicates that the patient is high risk for VTE event ( score 6 or greater). Surgical patient's in this group will benefit from both pharmacologic prophylaxis and intermittent compression devices . Surgical team will determine the balance between VTE  risk and bleeding risk and other clinical considerations

## 2020-10-28 NOTE — H&P PST ADULT - NSANTHOSAYNRD_GEN_A_CORE
No. [place] screening performed.  STOP BANG Legend: 0-2 = LOW Risk; 3-4 = INTERMEDIATE Risk; 5-8 = HIGH Risk

## 2020-10-29 LAB
CULTURE RESULTS: SIGNIFICANT CHANGE UP
MRSA PCR RESULT.: SIGNIFICANT CHANGE UP
S AUREUS DNA NOSE QL NAA+PROBE: SIGNIFICANT CHANGE UP
SPECIMEN SOURCE: SIGNIFICANT CHANGE UP

## 2020-11-08 DIAGNOSIS — Z01.818 ENCOUNTER FOR OTHER PREPROCEDURAL EXAMINATION: ICD-10-CM

## 2020-11-09 ENCOUNTER — APPOINTMENT (OUTPATIENT)
Dept: DISASTER EMERGENCY | Facility: CLINIC | Age: 70
End: 2020-11-09

## 2020-11-10 RX ORDER — SODIUM CHLORIDE 9 MG/ML
1000 INJECTION, SOLUTION INTRAVENOUS
Refills: 0 | Status: DISCONTINUED | OUTPATIENT
Start: 2020-11-12 | End: 2020-11-13

## 2020-11-10 RX ORDER — OXYCODONE HYDROCHLORIDE 5 MG/1
10 TABLET ORAL EVERY 4 HOURS
Refills: 0 | Status: DISCONTINUED | OUTPATIENT
Start: 2020-11-12 | End: 2020-11-13

## 2020-11-10 RX ORDER — METOCLOPRAMIDE HCL 10 MG
10 TABLET ORAL EVERY 6 HOURS
Refills: 0 | Status: DISCONTINUED | OUTPATIENT
Start: 2020-11-12 | End: 2020-11-13

## 2020-11-10 RX ORDER — HYDROMORPHONE HYDROCHLORIDE 2 MG/ML
0.5 INJECTION INTRAMUSCULAR; INTRAVENOUS; SUBCUTANEOUS EVERY 4 HOURS
Refills: 0 | Status: DISCONTINUED | OUTPATIENT
Start: 2020-11-12 | End: 2020-11-13

## 2020-11-10 RX ORDER — OXYCODONE HYDROCHLORIDE 5 MG/1
5 TABLET ORAL EVERY 4 HOURS
Refills: 0 | Status: DISCONTINUED | OUTPATIENT
Start: 2020-11-12 | End: 2020-11-13

## 2020-11-10 RX ORDER — ONDANSETRON 8 MG/1
4 TABLET, FILM COATED ORAL EVERY 6 HOURS
Refills: 0 | Status: DISCONTINUED | OUTPATIENT
Start: 2020-11-12 | End: 2020-11-13

## 2020-11-10 RX ORDER — POLYETHYLENE GLYCOL 3350 17 G/17G
17 POWDER, FOR SOLUTION ORAL DAILY
Refills: 0 | Status: DISCONTINUED | OUTPATIENT
Start: 2020-11-12 | End: 2020-11-13

## 2020-11-10 RX ORDER — LORATADINE 10 MG/1
10 TABLET ORAL DAILY
Refills: 0 | Status: DISCONTINUED | OUTPATIENT
Start: 2020-11-12 | End: 2020-11-13

## 2020-11-10 RX ORDER — ACETAMINOPHEN 500 MG
975 TABLET ORAL EVERY 8 HOURS
Refills: 0 | Status: DISCONTINUED | OUTPATIENT
Start: 2020-11-12 | End: 2020-11-13

## 2020-11-10 RX ORDER — MAGNESIUM HYDROXIDE 400 MG/1
30 TABLET, CHEWABLE ORAL DAILY
Refills: 0 | Status: DISCONTINUED | OUTPATIENT
Start: 2020-11-12 | End: 2020-11-13

## 2020-11-10 RX ORDER — SENNA PLUS 8.6 MG/1
2 TABLET ORAL AT BEDTIME
Refills: 0 | Status: DISCONTINUED | OUTPATIENT
Start: 2020-11-12 | End: 2020-11-13

## 2020-11-10 RX ORDER — ENOXAPARIN SODIUM 100 MG/ML
40 INJECTION SUBCUTANEOUS EVERY 24 HOURS
Refills: 0 | Status: DISCONTINUED | OUTPATIENT
Start: 2020-11-13 | End: 2020-11-13

## 2020-11-11 ENCOUNTER — RESULT REVIEW (OUTPATIENT)
Age: 70
End: 2020-11-11

## 2020-11-11 LAB — SARS-COV-2 N GENE NPH QL NAA+PROBE: NOT DETECTED

## 2020-11-11 PROCEDURE — 88331 PATH CONSLTJ SURG 1 BLK 1SPC: CPT | Mod: 26

## 2020-11-11 PROCEDURE — 88305 TISSUE EXAM BY PATHOLOGIST: CPT | Mod: 26

## 2020-11-11 PROCEDURE — 88300 SURGICAL PATH GROSS: CPT | Mod: 26,59

## 2020-11-12 ENCOUNTER — TRANSCRIPTION ENCOUNTER (OUTPATIENT)
Age: 70
End: 2020-11-12

## 2020-11-12 ENCOUNTER — APPOINTMENT (OUTPATIENT)
Dept: ORTHOPEDIC SURGERY | Facility: HOSPITAL | Age: 70
End: 2020-11-12

## 2020-11-12 ENCOUNTER — INPATIENT (INPATIENT)
Facility: HOSPITAL | Age: 70
LOS: 0 days | Discharge: HOME HEALTH | End: 2020-11-13
Attending: ORTHOPAEDIC SURGERY | Admitting: ORTHOPAEDIC SURGERY
Payer: MEDICARE

## 2020-11-12 VITALS
HEART RATE: 76 BPM | OXYGEN SATURATION: 96 % | SYSTOLIC BLOOD PRESSURE: 128 MMHG | TEMPERATURE: 98 F | DIASTOLIC BLOOD PRESSURE: 75 MMHG | RESPIRATION RATE: 16 BRPM | HEIGHT: 67 IN | WEIGHT: 173.94 LBS

## 2020-11-12 DIAGNOSIS — Z98.890 OTHER SPECIFIED POSTPROCEDURAL STATES: Chronic | ICD-10-CM

## 2020-11-12 DIAGNOSIS — Z96.651 PRESENCE OF RIGHT ARTIFICIAL KNEE JOINT: Chronic | ICD-10-CM

## 2020-11-12 DIAGNOSIS — Z87.39 PERSONAL HISTORY OF OTHER DISEASES OF THE MUSCULOSKELETAL SYSTEM AND CONNECTIVE TISSUE: Chronic | ICD-10-CM

## 2020-11-12 LAB
ANION GAP SERPL CALC-SCNC: 8 MMOL/L — SIGNIFICANT CHANGE UP (ref 5–17)
BUN SERPL-MCNC: 12 MG/DL — SIGNIFICANT CHANGE UP (ref 7–23)
CALCIUM SERPL-MCNC: 8.9 MG/DL — SIGNIFICANT CHANGE UP (ref 8.5–10.1)
CHLORIDE SERPL-SCNC: 107 MMOL/L — SIGNIFICANT CHANGE UP (ref 96–108)
CO2 SERPL-SCNC: 25 MMOL/L — SIGNIFICANT CHANGE UP (ref 22–31)
CREAT SERPL-MCNC: 0.71 MG/DL — SIGNIFICANT CHANGE UP (ref 0.5–1.3)
GLUCOSE SERPL-MCNC: 157 MG/DL — HIGH (ref 70–99)
HCT VFR BLD CALC: 36.7 % — SIGNIFICANT CHANGE UP (ref 34.5–45)
HGB BLD-MCNC: 12.4 G/DL — SIGNIFICANT CHANGE UP (ref 11.5–15.5)
INR BLD: 1.09 RATIO — SIGNIFICANT CHANGE UP (ref 0.88–1.16)
MCHC RBC-ENTMCNC: 31.6 PG — SIGNIFICANT CHANGE UP (ref 27–34)
MCHC RBC-ENTMCNC: 33.8 GM/DL — SIGNIFICANT CHANGE UP (ref 32–36)
MCV RBC AUTO: 93.6 FL — SIGNIFICANT CHANGE UP (ref 80–100)
NRBC # BLD: 0 /100 WBCS — SIGNIFICANT CHANGE UP (ref 0–0)
PLATELET # BLD AUTO: 274 K/UL — SIGNIFICANT CHANGE UP (ref 150–400)
POTASSIUM SERPL-MCNC: 4 MMOL/L — SIGNIFICANT CHANGE UP (ref 3.5–5.3)
POTASSIUM SERPL-SCNC: 4 MMOL/L — SIGNIFICANT CHANGE UP (ref 3.5–5.3)
PROTHROM AB SERPL-ACNC: 12.6 SEC — SIGNIFICANT CHANGE UP (ref 10.6–13.6)
RBC # BLD: 3.92 M/UL — SIGNIFICANT CHANGE UP (ref 3.8–5.2)
RBC # FLD: 12.8 % — SIGNIFICANT CHANGE UP (ref 10.3–14.5)
SODIUM SERPL-SCNC: 140 MMOL/L — SIGNIFICANT CHANGE UP (ref 135–145)
WBC # BLD: 10.18 K/UL — SIGNIFICANT CHANGE UP (ref 3.8–10.5)
WBC # FLD AUTO: 10.18 K/UL — SIGNIFICANT CHANGE UP (ref 3.8–10.5)

## 2020-11-12 PROCEDURE — 27487 REVISE/REPLACE KNEE JOINT: CPT | Mod: RT

## 2020-11-12 RX ORDER — ENOXAPARIN SODIUM 100 MG/ML
1 INJECTION SUBCUTANEOUS
Qty: 14 | Refills: 0
Start: 2020-11-12 | End: 2020-11-25

## 2020-11-12 RX ORDER — DEXAMETHASONE 0.5 MG/5ML
10 ELIXIR ORAL ONCE
Refills: 0 | Status: COMPLETED | OUTPATIENT
Start: 2020-11-13 | End: 2020-11-13

## 2020-11-12 RX ORDER — HYDROMORPHONE HYDROCHLORIDE 2 MG/ML
0.25 INJECTION INTRAMUSCULAR; INTRAVENOUS; SUBCUTANEOUS
Refills: 0 | Status: DISCONTINUED | OUTPATIENT
Start: 2020-11-12 | End: 2020-11-12

## 2020-11-12 RX ORDER — ONDANSETRON 8 MG/1
4 TABLET, FILM COATED ORAL ONCE
Refills: 0 | Status: DISCONTINUED | OUTPATIENT
Start: 2020-11-12 | End: 2020-11-12

## 2020-11-12 RX ORDER — DOCUSATE SODIUM 100 MG
0 CAPSULE ORAL
Qty: 0 | Refills: 0 | DISCHARGE

## 2020-11-12 RX ORDER — ASPIRIN/CALCIUM CARB/MAGNESIUM 324 MG
1 TABLET ORAL
Qty: 60 | Refills: 0
Start: 2020-11-12 | End: 2020-12-11

## 2020-11-12 RX ORDER — CEFAZOLIN SODIUM 1 G
2000 VIAL (EA) INJECTION EVERY 8 HOURS
Refills: 0 | Status: COMPLETED | OUTPATIENT
Start: 2020-11-12 | End: 2020-11-13

## 2020-11-12 RX ORDER — VANCOMYCIN HCL 1 G
1000 VIAL (EA) INTRAVENOUS ONCE
Refills: 0 | Status: COMPLETED | OUTPATIENT
Start: 2020-11-12 | End: 2020-11-12

## 2020-11-12 RX ORDER — ACETAMINOPHEN 500 MG
650 TABLET ORAL ONCE
Refills: 0 | Status: COMPLETED | OUTPATIENT
Start: 2020-11-12 | End: 2020-11-12

## 2020-11-12 RX ORDER — OXYCODONE AND ACETAMINOPHEN 5; 325 MG/1; MG/1
1 TABLET ORAL
Qty: 28 | Refills: 0
Start: 2020-11-12 | End: 2020-11-18

## 2020-11-12 RX ORDER — HYDROMORPHONE HYDROCHLORIDE 2 MG/ML
0.5 INJECTION INTRAMUSCULAR; INTRAVENOUS; SUBCUTANEOUS
Refills: 0 | Status: DISCONTINUED | OUTPATIENT
Start: 2020-11-12 | End: 2020-11-12

## 2020-11-12 RX ORDER — SODIUM CHLORIDE 9 MG/ML
3 INJECTION INTRAMUSCULAR; INTRAVENOUS; SUBCUTANEOUS EVERY 8 HOURS
Refills: 0 | Status: DISCONTINUED | OUTPATIENT
Start: 2020-11-12 | End: 2020-11-12

## 2020-11-12 RX ORDER — CELECOXIB 200 MG/1
200 CAPSULE ORAL ONCE
Refills: 0 | Status: COMPLETED | OUTPATIENT
Start: 2020-11-12 | End: 2020-11-12

## 2020-11-12 RX ORDER — SODIUM CHLORIDE 9 MG/ML
1000 INJECTION, SOLUTION INTRAVENOUS
Refills: 0 | Status: DISCONTINUED | OUTPATIENT
Start: 2020-11-12 | End: 2020-11-12

## 2020-11-12 RX ORDER — DOCUSATE SODIUM 100 MG
1 CAPSULE ORAL
Qty: 60 | Refills: 0
Start: 2020-11-12 | End: 2020-11-18

## 2020-11-12 RX ADMIN — HYDROMORPHONE HYDROCHLORIDE 0.5 MILLIGRAM(S): 2 INJECTION INTRAMUSCULAR; INTRAVENOUS; SUBCUTANEOUS at 18:38

## 2020-11-12 RX ADMIN — Medication 975 MILLIGRAM(S): at 21:52

## 2020-11-12 RX ADMIN — HYDROMORPHONE HYDROCHLORIDE 0.5 MILLIGRAM(S): 2 INJECTION INTRAMUSCULAR; INTRAVENOUS; SUBCUTANEOUS at 18:11

## 2020-11-12 RX ADMIN — Medication 100 MILLIGRAM(S): at 23:19

## 2020-11-12 RX ADMIN — Medication 650 MILLIGRAM(S): at 14:43

## 2020-11-12 RX ADMIN — SODIUM CHLORIDE 120 MILLILITER(S): 9 INJECTION, SOLUTION INTRAVENOUS at 18:11

## 2020-11-12 RX ADMIN — CELECOXIB 200 MILLIGRAM(S): 200 CAPSULE ORAL at 14:44

## 2020-11-12 RX ADMIN — Medication 975 MILLIGRAM(S): at 22:52

## 2020-11-12 RX ADMIN — ONDANSETRON 4 MILLIGRAM(S): 8 TABLET, FILM COATED ORAL at 20:39

## 2020-11-12 RX ADMIN — HYDROMORPHONE HYDROCHLORIDE 0.5 MILLIGRAM(S): 2 INJECTION INTRAMUSCULAR; INTRAVENOUS; SUBCUTANEOUS at 18:28

## 2020-11-12 RX ADMIN — HYDROMORPHONE HYDROCHLORIDE 0.5 MILLIGRAM(S): 2 INJECTION INTRAMUSCULAR; INTRAVENOUS; SUBCUTANEOUS at 18:21

## 2020-11-12 RX ADMIN — SODIUM CHLORIDE 120 MILLILITER(S): 9 INJECTION, SOLUTION INTRAVENOUS at 19:36

## 2020-11-12 NOTE — PHYSICAL THERAPY INITIAL EVALUATION ADULT - CRITERIA FOR SKILLED THERAPEUTIC INTERVENTIONS
therapy frequency/anticipated discharge recommendation/risk reduction/prevention/rehab potential/impairments found/functional limitations in following categories/predicted duration of therapy intervention

## 2020-11-12 NOTE — OCCUPATIONAL THERAPY INITIAL EVALUATION ADULT - GENERAL OBSERVATIONS, REHAB EVAL
Chart reviewed and events to date noted. OT evaluation completed. Encountered Pt semi supine in bed, NAD, with IV hep lock intact to R forearm, and ace wrap hemovac and prevena to R knee s/p R TKA revision POD 0 + WBAT.

## 2020-11-12 NOTE — DISCHARGE NOTE PROVIDER - NSDCFUADDINST_GEN_ALL_CORE_FT
1. Pain Control: take pain medications as prescribed and as needed.  2. Walking with full weight bearing as tolerated, with assistive devices (walker/cane as needed)  3. DVT Prophylaxis: Lovenox 40mg subcutaneous daily for 14 days. On post-operative day #15, start Aspirin 81 mg twice a day for 30 days. Do NOT skip doses.  4. PT as needed  5. Follow up with Dr. Mao as outpatient in 10-14 days after discharge from the hospital or rehab. Please call office for appointment ([phone])  6. Keep dressing clean and dry.  7. Remove dressing on post-operative day #10, with daily dressing changes as needed. Remove staples post-operative day #21.  8. Ice/elevate affected area as needed.

## 2020-11-12 NOTE — OCCUPATIONAL THERAPY INITIAL EVALUATION ADULT - RANGE OF MOTION EXAMINATION, LOWER EXTREMITY
Left LE Active ROM was WFL (within functional limits)/RLE: hip flexion/ extension grossly WFL, knee flexion/ extension grossly decreased by 75%, ankle dorsi flexion/ plantar flexion grossly WFL

## 2020-11-12 NOTE — OCCUPATIONAL THERAPY INITIAL EVALUATION ADULT - ADDITIONAL COMMENTS
Pre-operative assessment confirmed with Pt. Pt lives with spouse (Who can assist post op) in a private house with 3 steps to enter with no handrails. Pt will be staying on the first floor upon discharge. Bathroom contains a walk in shower stall, fixed/retractable shower head, regular toilet seat. Pt reports she was independent with ADL's and functional transfers/ ambulation (no AD).

## 2020-11-12 NOTE — PROGRESS NOTE ADULT - SUBJECTIVE AND OBJECTIVE BOX
Orthopedics Post-op Check  POD 0  Patient seen and examined at bedside. Pain is controlled. Pt feeling well, eating. No nausea or vomiting.    Vital Signs Last 24 Hrs  T(C): 36.7 (11-12-20 @ 19:29), Max: 36.9 (11-12-20 @ 14:12)  T(F): 98.1 (11-12-20 @ 19:29), Max: 98.4 (11-12-20 @ 14:12)  HR: 85 (11-12-20 @ 20:15) (76 - 91)  BP: 105/72 (11-12-20 @ 20:15) (105/72 - 128/75)  BP(mean): --  RR: 17 (11-12-20 @ 20:15) (15 - 18)  SpO2: 95% (11-12-20 @ 20:15) (95% - 98%)                        12.4   10.18 )-----------( 274      ( 12 Nov 2020 18:27 )             36.7     12 Nov 2020 18:27    140    |  107    |  12     ----------------------------<  157    4.0     |  25     |  0.71     Ca    8.9        12 Nov 2020 18:27      PT/INR - ( 12 Nov 2020 19:15 )   PT: 12.6 sec;   INR: 1.09 ratio             Exam:  Gen: NAD, resting comfortably  RLE:  Dressing c/d/i; HMV in place  +EHL/FHL/TA/GS  SILT L2-S1  +DP/PT 2+  Calf NTTP b/l  Compartments soft and compressible    A/P:  70yFemale Stable POD 0  s/p R revision TKA    -FU labs  - FU HMV output  -WBAT  -Pain control PRN  -PT/OT  -Ppx ABX  -DVT PE ppx: Lovenox  -Incentive spirometry  - Will discuss with Dr. Mao and advise if any changes to plan

## 2020-11-12 NOTE — DISCHARGE NOTE PROVIDER - NSDCCPCAREPLAN_GEN_ALL_CORE_FT
PRINCIPAL DISCHARGE DIAGNOSIS  Diagnosis: S/P revision of total knee, right  Assessment and Plan of Treatment:

## 2020-11-12 NOTE — DISCHARGE NOTE PROVIDER - NSDCMRMEDTOKEN_GEN_ALL_CORE_FT
aspirin 81 mg oral delayed release tablet: 1 tab(s) orally 2 times a day   Please start post operative day 15 and continue for 30 days MDD:2  Colace 100 mg oral capsule: 1 cap(s) orally 3 times a day, As Needed MDD:3  Lovenox 40 mg/0.4 mL injectable solution: 40 milligram(s) subcutaneously once a day   Take for 14 days until post operative day 15 MDD:1  Percocet 5 mg-325 mg oral tablet: 1 tab(s) orally every 6 hours, As Needed -for moderate pain MDD:4  Vitamin D3 5000 intl units (125 mcg) oral tablet: orally once a day  ZyrTEC 10 mg oral tablet: 1 tab(s) orally once a day

## 2020-11-12 NOTE — DISCHARGE NOTE PROVIDER - CARE PROVIDER_API CALL
Howard Mao  ORTHOPAEDIC SURGERY  [address] [place] [address]  Phone: [phone]  Fax: [phone]  Follow Up Time:

## 2020-11-12 NOTE — OCCUPATIONAL THERAPY INITIAL EVALUATION ADULT - TRANSFER TRAINING, PT EVAL
Pt will perform safe functional transfers with use of rolling walker with Modified [place] by the end of 1 week.

## 2020-11-12 NOTE — PHYSICAL THERAPY INITIAL EVALUATION ADULT - GAIT TRAINING, PT EVAL
Patient will ambulate 500 feet with rolling walker independently for community ambulation in 2-3 days. Patient will ascend/descend 3 steps with B axillary crutches independently in 2-3 days to safely navigate home environment.

## 2020-11-12 NOTE — DISCHARGE NOTE PROVIDER - HOSPITAL COURSE
The patient is a 70 year old female status post elective revision total knee Arthroplasty to the right knee after failing outpatient nonoperative conservative management. The patient presented to [facility] after being medically cleared for an elective surgical procedure. The patient was taken to the operating room on the date mentioned above. Prophylactic antibiotics were started before the procedure and continued for 24 hours. There were no complications during the procedure and patient tolerated the procedure well. The patient was transferred to the recovery room in stable condition and subsequently to the surgical floor. The patient was placed on Lovenox for anticoagulation. All home medications were continued. The patient received physical therapy daily and daily labs were followed. The dressing was kept clean, dry, intact and changed on POD 3. ** The rest of the hospital stay was unremarkable** The patient is a 70 year old female status post elective revision total knee Arthroplasty to the right knee after failing outpatient nonoperative conservative management. The patient presented to [facility] after being medically cleared for an elective surgical procedure. The patient was taken to the operating room on the date mentioned above. Prophylactic antibiotics were started before the procedure and continued for 24 hours. There were no complications during the procedure and patient tolerated the procedure well. The patient was transferred to the recovery room in stable condition and subsequently to the surgical floor. The patient was placed on Lovenox for anticoagulation. All home medications were continued. The patient received physical therapy daily and daily labs were followed. The dressing was kept clean, dry, intact and changed on POD 3. The rest of the hospital stay was unremarkable.

## 2020-11-12 NOTE — CONSULT NOTE ADULT - SUBJECTIVE AND OBJECTIVE BOX
Chief Complaint:  Patient is a 70y old  Female who presents with a chief complaint of     HPI: Currently no sob or chest pain or palpitation . No current cough or fever . No current nausea or vomiting . Pain controlled .      Review of Systems:    General:  No wt loss, fevers, chills, night sweats  Eyes:  Good vision, no reported pain  ENT:  No sore throat, pain, runny nose, dysphagia  CV:  No pain, palpitations, hypo/hypertension  Resp:  No dyspnea, cough, tachypnea, wheezing  GI:  No pain, nausea, vomiting, diarrhea, constipation  :  No pain, bleeding, incontinence, nocturia  Muscle:  No pain, weakness  Breast:  No pain, abscess, mass, discharge  Neuro:  No weakness, tingling, memory problems  Psych:  No fatigue, insomnia, mood problems, depression  Endocrine:  No polyuria, polydypsia, cold/heat intolerance  Heme:  No petechiae, ecchymosis, easy bruisability  Skin:  No rash, tattoos, scars, edema    Relevant Family History: NC . Allergy : PCN .    Relevant Social History: NC .     PMH : NC .    Physical Exam:      Vital Signs:  Vital Signs Last 24 Hrs  T(C): 36.8 (2020 21:30), Max: 36.9 (2020 14:12)  T(F): 98.3 (2020 21:30), Max: 98.4 (2020 14:12)  HR: 70 (2020 21:30) (70 - 91)  BP: 111/66 (2020 21:30) (105/72 - 128/75)  BP(mean): --  RR: 18 (2020 21:30) (15 - 18)  SpO2: 97% (2020 21:30) (95% - 98%)  Daily Height in cm: 170.18 (2020 19:29)    Daily Weight in k.9 (2020 19:29)  I&O's Summary    2020 07:01  -  2020 22:26  --------------------------------------------------------  IN: 100 mL / OUT: 0 mL / NET: 100 mL        General:  Appears stated age, well-groomed, well-nourished, no distress  HEENT:  NC/AT, patent nares w/ pink mucosa, OP clear w/o lesions, PERRL, EOMI, conjunctivae clear, no thyromegaly, nodules, adenopathy, no JVD  Chest:  Full & symmetric excursion, no increased effort, breath sounds clear  Cardiovascular:  Regular rhythm, S1, S2, no murmur/rub/S3/S4, no carotid/femoral/abdominal bruit, radial/pedal pulses 2+, no edema  Abdomen:  Soft, non-tender, non-distended, normoactive bowel sounds, no HSM  Extremities: Fulness right knee with drain . + Pulse .  Skin:  No rash/erythema/ecchymoses/petechiae/wounds/abscess/warm/dry  Musculoskeletal: Intact .  Neuro/Psych:  Alert, oriented, normal and symmetric strength in UEs, LEs ,    Laboratory:                            12.4   10.18 )-----------( 274      ( 2020 18:27 )             36.7     11-12    140  |  107  |  12  ----------------------------<  157<H>  4.0   |  25  |  0.71    Ca    8.9      2020 18:27            CAPILLARY BLOOD GLUCOSE          PT/INR - ( 2020 19:15 )   PT: 12.6 sec;   INR: 1.09 ratio               Imaging:      Assessment: S/P revision right knee .       Plan: PT/OT .  Pain control . DVT prophylaxis , Incentive spirometry .

## 2020-11-12 NOTE — PHYSICAL THERAPY INITIAL EVALUATION ADULT - ADDITIONAL COMMENTS
As per pre-op and reviewed with patient POD #0: Patient endorses typical pain at best is 4/10, at worst is 10/10. Per patient, pain is worse with movement and prolonged sitting/standing. Pain does not take medications or use any ice/heat for pain. Home set-up: lives with  in private house with 3 stair steps to enter without handrails to enter at front. Patient plans to stay at main level of house during recovery. Bathroom is a walk in shower with grab bars, with a comfort height toilet seat, with both fixed & retractable shower heads. Endorses will be supported by  post-op. Patient is currently independent with mobility. Reports owns a rolling walker and commode. Patient is right-handed and drives; wears glasses always. Patient denies falls in past 6 months. Denies buckling.

## 2020-11-12 NOTE — PHYSICAL THERAPY INITIAL EVALUATION ADULT - ACTIVE RANGE OF MOTION EXAMINATION, REHAB EVAL
deficits as listed below/R knee 5-90/bilateral upper extremity Active ROM was WFL (within functional limits)/bilateral  lower extremity Active ROM was WFL (within functional limits)

## 2020-11-13 ENCOUNTER — TRANSCRIPTION ENCOUNTER (OUTPATIENT)
Age: 70
End: 2020-11-13

## 2020-11-13 VITALS
SYSTOLIC BLOOD PRESSURE: 108 MMHG | HEART RATE: 79 BPM | TEMPERATURE: 98 F | OXYGEN SATURATION: 96 % | RESPIRATION RATE: 18 BRPM | DIASTOLIC BLOOD PRESSURE: 66 MMHG

## 2020-11-13 LAB
ANION GAP SERPL CALC-SCNC: 7 MMOL/L — SIGNIFICANT CHANGE UP (ref 5–17)
BUN SERPL-MCNC: 13 MG/DL — SIGNIFICANT CHANGE UP (ref 7–23)
CALCIUM SERPL-MCNC: 8.8 MG/DL — SIGNIFICANT CHANGE UP (ref 8.5–10.1)
CHLORIDE SERPL-SCNC: 106 MMOL/L — SIGNIFICANT CHANGE UP (ref 96–108)
CO2 SERPL-SCNC: 26 MMOL/L — SIGNIFICANT CHANGE UP (ref 22–31)
CREAT SERPL-MCNC: 0.75 MG/DL — SIGNIFICANT CHANGE UP (ref 0.5–1.3)
GLUCOSE SERPL-MCNC: 126 MG/DL — HIGH (ref 70–99)
HCT VFR BLD CALC: 33.6 % — LOW (ref 34.5–45)
HGB BLD-MCNC: 11 G/DL — LOW (ref 11.5–15.5)
MCHC RBC-ENTMCNC: 31.2 PG — SIGNIFICANT CHANGE UP (ref 27–34)
MCHC RBC-ENTMCNC: 32.7 GM/DL — SIGNIFICANT CHANGE UP (ref 32–36)
MCV RBC AUTO: 95.2 FL — SIGNIFICANT CHANGE UP (ref 80–100)
NRBC # BLD: 0 /100 WBCS — SIGNIFICANT CHANGE UP (ref 0–0)
PLATELET # BLD AUTO: 256 K/UL — SIGNIFICANT CHANGE UP (ref 150–400)
POTASSIUM SERPL-MCNC: 4.3 MMOL/L — SIGNIFICANT CHANGE UP (ref 3.5–5.3)
POTASSIUM SERPL-SCNC: 4.3 MMOL/L — SIGNIFICANT CHANGE UP (ref 3.5–5.3)
RBC # BLD: 3.53 M/UL — LOW (ref 3.8–5.2)
RBC # FLD: 12.9 % — SIGNIFICANT CHANGE UP (ref 10.3–14.5)
SODIUM SERPL-SCNC: 139 MMOL/L — SIGNIFICANT CHANGE UP (ref 135–145)
WBC # BLD: 12.28 K/UL — HIGH (ref 3.8–10.5)
WBC # FLD AUTO: 12.28 K/UL — HIGH (ref 3.8–10.5)

## 2020-11-13 PROCEDURE — 73560 X-RAY EXAM OF KNEE 1 OR 2: CPT | Mod: 26,RT

## 2020-11-13 RX ORDER — ENOXAPARIN SODIUM 100 MG/ML
1 INJECTION SUBCUTANEOUS
Qty: 14 | Refills: 0
Start: 2020-11-13 | End: 2020-11-26

## 2020-11-13 RX ADMIN — OXYCODONE HYDROCHLORIDE 10 MILLIGRAM(S): 5 TABLET ORAL at 10:35

## 2020-11-13 RX ADMIN — Medication 975 MILLIGRAM(S): at 06:35

## 2020-11-13 RX ADMIN — LORATADINE 10 MILLIGRAM(S): 10 TABLET ORAL at 12:15

## 2020-11-13 RX ADMIN — ENOXAPARIN SODIUM 40 MILLIGRAM(S): 100 INJECTION SUBCUTANEOUS at 08:15

## 2020-11-13 RX ADMIN — Medication 102 MILLIGRAM(S): at 05:32

## 2020-11-13 RX ADMIN — Medication 975 MILLIGRAM(S): at 14:57

## 2020-11-13 RX ADMIN — Medication 100 MILLIGRAM(S): at 06:26

## 2020-11-13 RX ADMIN — Medication 975 MILLIGRAM(S): at 15:03

## 2020-11-13 RX ADMIN — Medication 975 MILLIGRAM(S): at 05:31

## 2020-11-13 RX ADMIN — OXYCODONE HYDROCHLORIDE 10 MILLIGRAM(S): 5 TABLET ORAL at 11:35

## 2020-11-13 RX ADMIN — Medication 1 TABLET(S): at 12:15

## 2020-11-13 NOTE — DISCHARGE NOTE NURSING/CASE MANAGEMENT/SOCIAL WORK - PATIENT PORTAL LINK FT
You can access the FollowMyHealth Patient Portal offered by [facility] by registering at the following website: [email or web address]. By joining [email or web address]’s FollowMyHealth portal, you will also be able to view your health information using other applications (apps) compatible with our system.

## 2020-11-13 NOTE — PROGRESS NOTE ADULT - SUBJECTIVE AND OBJECTIVE BOX
Orthopedics Post-op Check  POD 1  Patient seen and examined at bedside. Pain is controlled. Pt feeling well, eating. No nausea or vomiting.    Vital Signs Last 24 Hrs  T(C): 36.4 (13 Nov 2020 02:30), Max: 36.9 (12 Nov 2020 14:12)  T(F): 97.6 (13 Nov 2020 02:30), Max: 98.4 (12 Nov 2020 14:12)  HR: 53 (13 Nov 2020 02:30) (51 - 91)  BP: 104/61 (13 Nov 2020 02:30) (104/61 - 128/75)  BP(mean): --  RR: 17 (13 Nov 2020 02:30) (15 - 18)  SpO2: 97% (13 Nov 2020 02:30) (95% - 98%)    Exam:  Gen: NAD, resting comfortably  RLE:  Dressing c/d/i; HMV DC'd  +EHL/FHL/TA/GS  SILT L3-S1  +DP/PT   Calf NTTP b/l  Compartments soft and compressible    A/P:  70yFemale Stable POD 1  s/p R revision TKA    -FU labs  -HMV DC'd  -WBAT  -Pain control PRN  -PT/OT  -Ppx ABX  -DVT PE ppx: Lovenox  -Incentive spirometry  - Will discuss with Dr. Mao and advise if any changes to plan

## 2020-11-14 ENCOUNTER — INPATIENT (INPATIENT)
Facility: HOSPITAL | Age: 70
LOS: 1 days | Discharge: HOME HEALTH | End: 2020-11-16
Attending: ORTHOPAEDIC SURGERY | Admitting: ORTHOPAEDIC SURGERY
Payer: MEDICARE

## 2020-11-14 VITALS
HEART RATE: 77 BPM | OXYGEN SATURATION: 99 % | TEMPERATURE: 98 F | HEIGHT: 67 IN | DIASTOLIC BLOOD PRESSURE: 76 MMHG | RESPIRATION RATE: 16 BRPM | WEIGHT: 169.98 LBS | SYSTOLIC BLOOD PRESSURE: 114 MMHG

## 2020-11-14 DIAGNOSIS — Z98.890 OTHER SPECIFIED POSTPROCEDURAL STATES: Chronic | ICD-10-CM

## 2020-11-14 DIAGNOSIS — Z96.651 PRESENCE OF RIGHT ARTIFICIAL KNEE JOINT: Chronic | ICD-10-CM

## 2020-11-14 DIAGNOSIS — Z87.39 PERSONAL HISTORY OF OTHER DISEASES OF THE MUSCULOSKELETAL SYSTEM AND CONNECTIVE TISSUE: Chronic | ICD-10-CM

## 2020-11-14 LAB
ALBUMIN SERPL ELPH-MCNC: 3.5 G/DL — SIGNIFICANT CHANGE UP (ref 3.3–5)
ALP SERPL-CCNC: 52 U/L — SIGNIFICANT CHANGE UP (ref 40–120)
ALT FLD-CCNC: 17 U/L — SIGNIFICANT CHANGE UP (ref 12–78)
ANION GAP SERPL CALC-SCNC: 6 MMOL/L — SIGNIFICANT CHANGE UP (ref 5–17)
APTT BLD: 28.6 SEC — SIGNIFICANT CHANGE UP (ref 27.5–35.5)
AST SERPL-CCNC: 17 U/L — SIGNIFICANT CHANGE UP (ref 15–37)
BASOPHILS # BLD AUTO: 0.03 K/UL — SIGNIFICANT CHANGE UP (ref 0–0.2)
BASOPHILS NFR BLD AUTO: 0.3 % — SIGNIFICANT CHANGE UP (ref 0–2)
BILIRUB SERPL-MCNC: 0.3 MG/DL — SIGNIFICANT CHANGE UP (ref 0.2–1.2)
BUN SERPL-MCNC: 21 MG/DL — SIGNIFICANT CHANGE UP (ref 7–23)
CALCIUM SERPL-MCNC: 8.8 MG/DL — SIGNIFICANT CHANGE UP (ref 8.5–10.1)
CHLORIDE SERPL-SCNC: 109 MMOL/L — HIGH (ref 96–108)
CO2 SERPL-SCNC: 27 MMOL/L — SIGNIFICANT CHANGE UP (ref 22–31)
CREAT SERPL-MCNC: 0.67 MG/DL — SIGNIFICANT CHANGE UP (ref 0.5–1.3)
EOSINOPHIL # BLD AUTO: 0.02 K/UL — SIGNIFICANT CHANGE UP (ref 0–0.5)
EOSINOPHIL NFR BLD AUTO: 0.2 % — SIGNIFICANT CHANGE UP (ref 0–6)
GLUCOSE SERPL-MCNC: 103 MG/DL — HIGH (ref 70–99)
HCT VFR BLD CALC: 31.2 % — LOW (ref 34.5–45)
HGB BLD-MCNC: 10.2 G/DL — LOW (ref 11.5–15.5)
IMM GRANULOCYTES NFR BLD AUTO: 0.4 % — SIGNIFICANT CHANGE UP (ref 0–1.5)
INR BLD: 0.98 RATIO — SIGNIFICANT CHANGE UP (ref 0.88–1.16)
LYMPHOCYTES # BLD AUTO: 19.2 % — SIGNIFICANT CHANGE UP (ref 13–44)
LYMPHOCYTES # BLD AUTO: 2 K/UL — SIGNIFICANT CHANGE UP (ref 1–3.3)
MCHC RBC-ENTMCNC: 30.6 PG — SIGNIFICANT CHANGE UP (ref 27–34)
MCHC RBC-ENTMCNC: 32.7 GM/DL — SIGNIFICANT CHANGE UP (ref 32–36)
MCV RBC AUTO: 93.7 FL — SIGNIFICANT CHANGE UP (ref 80–100)
MONOCYTES # BLD AUTO: 0.88 K/UL — SIGNIFICANT CHANGE UP (ref 0–0.9)
MONOCYTES NFR BLD AUTO: 8.4 % — SIGNIFICANT CHANGE UP (ref 2–14)
NEUTROPHILS # BLD AUTO: 7.46 K/UL — HIGH (ref 1.8–7.4)
NEUTROPHILS NFR BLD AUTO: 71.5 % — SIGNIFICANT CHANGE UP (ref 43–77)
NRBC # BLD: 0 /100 WBCS — SIGNIFICANT CHANGE UP (ref 0–0)
PLATELET # BLD AUTO: 237 K/UL — SIGNIFICANT CHANGE UP (ref 150–400)
POTASSIUM SERPL-MCNC: 3.6 MMOL/L — SIGNIFICANT CHANGE UP (ref 3.5–5.3)
POTASSIUM SERPL-SCNC: 3.6 MMOL/L — SIGNIFICANT CHANGE UP (ref 3.5–5.3)
PROT SERPL-MCNC: 6.9 GM/DL — SIGNIFICANT CHANGE UP (ref 6–8.3)
PROTHROM AB SERPL-ACNC: 11.4 SEC — SIGNIFICANT CHANGE UP (ref 10.6–13.6)
RBC # BLD: 3.33 M/UL — LOW (ref 3.8–5.2)
RBC # FLD: 13.2 % — SIGNIFICANT CHANGE UP (ref 10.3–14.5)
SARS-COV-2 RNA SPEC QL NAA+PROBE: SIGNIFICANT CHANGE UP
SODIUM SERPL-SCNC: 142 MMOL/L — SIGNIFICANT CHANGE UP (ref 135–145)
WBC # BLD: 10.43 K/UL — SIGNIFICANT CHANGE UP (ref 3.8–10.5)
WBC # FLD AUTO: 10.43 K/UL — SIGNIFICANT CHANGE UP (ref 3.8–10.5)

## 2020-11-14 PROCEDURE — 99285 EMERGENCY DEPT VISIT HI MDM: CPT | Mod: CS

## 2020-11-14 RX ORDER — ONDANSETRON 8 MG/1
4 TABLET, FILM COATED ORAL ONCE
Refills: 0 | Status: COMPLETED | OUTPATIENT
Start: 2020-11-14 | End: 2020-11-14

## 2020-11-14 RX ORDER — HYDROMORPHONE HYDROCHLORIDE 2 MG/ML
0.5 INJECTION INTRAMUSCULAR; INTRAVENOUS; SUBCUTANEOUS EVERY 6 HOURS
Refills: 0 | Status: DISCONTINUED | OUTPATIENT
Start: 2020-11-14 | End: 2020-11-16

## 2020-11-14 RX ORDER — CETIRIZINE HYDROCHLORIDE 10 MG/1
1 TABLET ORAL
Qty: 0 | Refills: 0 | DISCHARGE

## 2020-11-14 RX ORDER — MORPHINE SULFATE 50 MG/1
2 CAPSULE, EXTENDED RELEASE ORAL ONCE
Refills: 0 | Status: DISCONTINUED | OUTPATIENT
Start: 2020-11-14 | End: 2020-11-14

## 2020-11-14 RX ORDER — OXYCODONE HYDROCHLORIDE 5 MG/1
10 TABLET ORAL EVERY 4 HOURS
Refills: 0 | Status: DISCONTINUED | OUTPATIENT
Start: 2020-11-14 | End: 2020-11-16

## 2020-11-14 RX ORDER — DIPHENHYDRAMINE HCL 50 MG
25 CAPSULE ORAL EVERY 6 HOURS
Refills: 0 | Status: DISCONTINUED | OUTPATIENT
Start: 2020-11-14 | End: 2020-11-16

## 2020-11-14 RX ORDER — MORPHINE SULFATE 50 MG/1
4 CAPSULE, EXTENDED RELEASE ORAL ONCE
Refills: 0 | Status: DISCONTINUED | OUTPATIENT
Start: 2020-11-14 | End: 2020-11-14

## 2020-11-14 RX ORDER — FOLIC ACID 0.8 MG
1 TABLET ORAL DAILY
Refills: 0 | Status: DISCONTINUED | OUTPATIENT
Start: 2020-11-14 | End: 2020-11-16

## 2020-11-14 RX ORDER — ASCORBIC ACID 60 MG
500 TABLET,CHEWABLE ORAL
Refills: 0 | Status: DISCONTINUED | OUTPATIENT
Start: 2020-11-14 | End: 2020-11-16

## 2020-11-14 RX ORDER — CHOLECALCIFEROL (VITAMIN D3) 125 MCG
0 CAPSULE ORAL
Qty: 0 | Refills: 0 | DISCHARGE

## 2020-11-14 RX ORDER — CEFAZOLIN SODIUM 1 G
2000 VIAL (EA) INJECTION EVERY 8 HOURS
Refills: 0 | Status: COMPLETED | OUTPATIENT
Start: 2020-11-14 | End: 2020-11-16

## 2020-11-14 RX ORDER — ASPIRIN/CALCIUM CARB/MAGNESIUM 324 MG
325 TABLET ORAL
Refills: 0 | Status: DISCONTINUED | OUTPATIENT
Start: 2020-11-14 | End: 2020-11-16

## 2020-11-14 RX ORDER — BENZOCAINE AND MENTHOL 5; 1 G/100ML; G/100ML
1 LIQUID ORAL
Refills: 0 | Status: DISCONTINUED | OUTPATIENT
Start: 2020-11-14 | End: 2020-11-16

## 2020-11-14 RX ORDER — LANOLIN ALCOHOL/MO/W.PET/CERES
3 CREAM (GRAM) TOPICAL AT BEDTIME
Refills: 0 | Status: DISCONTINUED | OUTPATIENT
Start: 2020-11-14 | End: 2020-11-16

## 2020-11-14 RX ORDER — OXYCODONE HYDROCHLORIDE 5 MG/1
5 TABLET ORAL EVERY 4 HOURS
Refills: 0 | Status: DISCONTINUED | OUTPATIENT
Start: 2020-11-14 | End: 2020-11-16

## 2020-11-14 RX ORDER — ACETAMINOPHEN 500 MG
650 TABLET ORAL EVERY 6 HOURS
Refills: 0 | Status: DISCONTINUED | OUTPATIENT
Start: 2020-11-14 | End: 2020-11-15

## 2020-11-14 RX ORDER — MAGNESIUM HYDROXIDE 400 MG/1
30 TABLET, CHEWABLE ORAL DAILY
Refills: 0 | Status: DISCONTINUED | OUTPATIENT
Start: 2020-11-14 | End: 2020-11-16

## 2020-11-14 RX ADMIN — HYDROMORPHONE HYDROCHLORIDE 0.5 MILLIGRAM(S): 2 INJECTION INTRAMUSCULAR; INTRAVENOUS; SUBCUTANEOUS at 10:21

## 2020-11-14 RX ADMIN — ONDANSETRON 4 MILLIGRAM(S): 8 TABLET, FILM COATED ORAL at 08:06

## 2020-11-14 RX ADMIN — Medication 325 MILLIGRAM(S): at 17:53

## 2020-11-14 RX ADMIN — Medication 500 MILLIGRAM(S): at 17:53

## 2020-11-14 RX ADMIN — MORPHINE SULFATE 4 MILLIGRAM(S): 50 CAPSULE, EXTENDED RELEASE ORAL at 08:06

## 2020-11-14 RX ADMIN — HYDROMORPHONE HYDROCHLORIDE 0.5 MILLIGRAM(S): 2 INJECTION INTRAMUSCULAR; INTRAVENOUS; SUBCUTANEOUS at 17:54

## 2020-11-14 RX ADMIN — MORPHINE SULFATE 2 MILLIGRAM(S): 50 CAPSULE, EXTENDED RELEASE ORAL at 07:37

## 2020-11-14 RX ADMIN — MORPHINE SULFATE 2 MILLIGRAM(S): 50 CAPSULE, EXTENDED RELEASE ORAL at 08:07

## 2020-11-14 RX ADMIN — OXYCODONE HYDROCHLORIDE 10 MILLIGRAM(S): 5 TABLET ORAL at 15:56

## 2020-11-14 RX ADMIN — MORPHINE SULFATE 4 MILLIGRAM(S): 50 CAPSULE, EXTENDED RELEASE ORAL at 08:36

## 2020-11-14 RX ADMIN — Medication 100 MILLIGRAM(S): at 22:26

## 2020-11-14 RX ADMIN — OXYCODONE HYDROCHLORIDE 10 MILLIGRAM(S): 5 TABLET ORAL at 16:44

## 2020-11-14 RX ADMIN — ONDANSETRON 4 MILLIGRAM(S): 8 TABLET, FILM COATED ORAL at 13:03

## 2020-11-14 RX ADMIN — Medication 100 MILLIGRAM(S): at 14:52

## 2020-11-14 RX ADMIN — HYDROMORPHONE HYDROCHLORIDE 0.5 MILLIGRAM(S): 2 INJECTION INTRAMUSCULAR; INTRAVENOUS; SUBCUTANEOUS at 11:28

## 2020-11-14 RX ADMIN — Medication 1 MILLIGRAM(S): at 12:50

## 2020-11-14 RX ADMIN — Medication 1 TABLET(S): at 12:51

## 2020-11-14 NOTE — H&P ADULT - NSHPPHYSICALEXAM_GEN_ALL_CORE
Exam:  General: Awake, alert, moderate painful distress  RLE:   Dressing saturated, but intact; wound vac appears full without continued drainage; no surrounding signs of infection  TTP around incision site/dressing  SILT. +TA/GSc/EHL/FHL  DP/PT pulses palpable  Compartments soft and compressible  No calf tenderness bilaterally

## 2020-11-14 NOTE — CONSULT NOTE ADULT - SUBJECTIVE AND OBJECTIVE BOX
70F POD2 R Revision TKA by Dr. Mao who presents to the Emergency Department for uncontrolled postoperative pain and saturated surgical dressing. She reports that she has had continued pain since being discharged from the hospital yesterday. Patient states that she has not been ambulating at home and was scheduled for home physical therapy today but came to the hospital due to continued uncontrolled pain. She also reports that her dressing is very saturated and the wound vac is filled and she does not have replacement canisters to switch it. Otherwise denies trauma, numbness/tingling, weakness, shortness of breath, chest pain, fever, chills, other acute complaints.     Vital Signs Last 24 Hrs  T(C): 36.8 (14 Nov 2020 07:02), Max: 36.8 (14 Nov 2020 07:02)  T(F): 98.2 (14 Nov 2020 07:02), Max: 98.2 (14 Nov 2020 07:02)  HR: 77 (14 Nov 2020 07:02) (67 - 79)  BP: 114/76 (14 Nov 2020 07:02) (108/66 - 117/58)  BP(mean): --  RR: 16 (14 Nov 2020 07:02) (16 - 18)  SpO2: 99% (14 Nov 2020 07:02) (96% - 99%)                          10.2   10.43 )-----------( 237      ( 14 Nov 2020 07:37 )             31.2     Exam:  General: Awake, alert, moderate painful distress  RLE:   Dressing saturated, but intact; wound vac appears full without continued drainage; no surrounding signs of infection  TTP around incision site/dressing  SILT. +TA/GSc/EHL/FHL  DP/PT pulses palpable  Compartments soft and compressible  No calf tenderness bilaterally     70F POD2 R Revision TKA by Dr. Mao who presents to the Emergency Department for uncontrolled postoperative pain and saturated surgical dressing. She reports that she has had continued pain since being discharged from the hospital yesterday. Patient states that she has not been ambulating at home and was scheduled for home physical therapy today but came to the hospital due to continued uncontrolled pain. She took three Percocet at home without relief of pain. She also reports that her dressing is very saturated and the wound vac is filled and she does not have replacement canisters to switch it. Otherwise denies trauma, numbness/tingling, weakness, shortness of breath, chest pain, fever, chills, other acute complaints.     Vital Signs Last 24 Hrs  T(C): 36.8 (14 Nov 2020 07:02), Max: 36.8 (14 Nov 2020 07:02)  T(F): 98.2 (14 Nov 2020 07:02), Max: 98.2 (14 Nov 2020 07:02)  HR: 77 (14 Nov 2020 07:02) (67 - 79)  BP: 114/76 (14 Nov 2020 07:02) (108/66 - 117/58)  BP(mean): --  RR: 16 (14 Nov 2020 07:02) (16 - 18)  SpO2: 99% (14 Nov 2020 07:02) (96% - 99%)                          10.2   10.43 )-----------( 237      ( 14 Nov 2020 07:37 )             31.2     Exam:  General: Awake, alert, moderate painful distress  RLE:   Dressing saturated, but intact; wound vac appears full without continued drainage; no surrounding signs of infection  TTP around incision site/dressing  SILT. +TA/GSc/EHL/FHL  DP/PT pulses palpable  Compartments soft and compressible  No calf tenderness bilaterally

## 2020-11-14 NOTE — ED PROVIDER NOTE - PHYSICAL EXAMINATION
Gen: Alert, Well appearing. NAD    Head: NC, AT, PERRL, normal lids/conjunctiva   ENT: patent oropharynx without erythema/exudate, uvula midline  Neck: supple, no tenderness/meningismus  Pulm: Bilateral clear BS, normal resp effort  CV: RRR, no M/R/G, +dist pulses   Abd: soft, NT/ND, +BS, no guarding/rebound tenderness  Mskel: + rt knee wound vac over knee filled with gross blood. no obv erythema/warmth to knee. DP/PT pulses intact. Sensation grossly intact  Skin: no rash, no bruising  Neuro: AAOx3, no sensory/motor deficits, CN 2-12 intact

## 2020-11-14 NOTE — H&P ADULT - HISTORY OF PRESENT ILLNESS
70F POD2 R Revision TKA by Dr. Mao who presents to the Emergency Department for uncontrolled postoperative pain and saturated surgical dressing. She reports that she has had continued pain since being discharged from the hospital yesterday. Patient states that she has not been ambulating at home and was scheduled for home physical therapy today but came to the hospital due to continued uncontrolled pain. She took three Percocet at home without relief of pain. She also reports that her dressing is very saturated and the wound vac is filled and she does not have replacement canisters to switch it. Otherwise denies trauma, numbness/tingling, weakness, shortness of breath, chest pain, fever, chills, other acute complaints.     Vital Signs Last 24 Hrs  T(C): 36.8 (14 Nov 2020 07:02), Max: 36.8 (14 Nov 2020 07:02)  T(F): 98.2 (14 Nov 2020 07:02), Max: 98.2 (14 Nov 2020 07:02)  HR: 77 (14 Nov 2020 07:02) (67 - 79)  BP: 114/76 (14 Nov 2020 07:02) (108/66 - 117/58)  BP(mean): --  RR: 16 (14 Nov 2020 07:02) (16 - 18)  SpO2: 99% (14 Nov 2020 07:02) (96% - 99%)                          10.2   10.43 )-----------( 237      ( 14 Nov 2020 07:37 )             31.2

## 2020-11-14 NOTE — ED ADULT TRIAGE NOTE - CHIEF COMPLAINT QUOTE
Pt complains of right knee pain and states drainage device to right knee is not draining. S/p right knee surgery on Thursday

## 2020-11-14 NOTE — H&P ADULT - ASSESSMENT
70F POD2 R Revision TKA    Plan:   Admission to Orthopedics Dr. Mao on 2W  Pain control PRN  FU labs  WBAT/Knee immobilizer  Plan to replace Preveena dressing with new vac  DVT ppx: ASA BID; Hold Lovenox  Antibx Ancef Q8 hrs x48 hours  No PT at this time  Discussed with Dr. Mao who agrees with treatment plan   70F POD2 R Revision TKA    Plan:   Admission to Orthopedics Dr. Mao on 2W  Pain control PRN  FU labs  Preveena changed   Knee immobilizer  WBAT In Knee Immobilizer to Bathroom Only  No PT at this time  DVT ppx: ASA BID; Hold Lovenox  Antibx Ancef Q8 hrs x48 hours  Discussed with Dr. Mao who agrees with treatment plan

## 2020-11-14 NOTE — ED ADULT NURSE NOTE - OBJECTIVE STATEMENT
pt A&Ox4 with post op knee surgery with complaints of bleeding from  surgical site. pt with a right prevena vac, dressing is bloody. Denies fevers, nausea chills. Denies PMH. pt complaints of pain to the right knee. some swelling noted, pt reports numbness to toes.

## 2020-11-14 NOTE — ED ADULT NURSE NOTE - PSH
H/O arthroscopy of knee  x3 right  H/O arthroscopy of shoulder  left shoulder x2  H/O foot surgery  right foot (neuroma)  H/O thumb surgery    H/O total knee replacement, right  2016  History of skin surgery  lip  History of trigger finger

## 2020-11-14 NOTE — ED PROVIDER NOTE - OBJECTIVE STATEMENT
69yo female with no significant pmh presents with worsening rt knee pain. Pt had Rt total knee revision yesterday and dc with wound vac.  Pt reports pain is unrelieved with the percocet and that she was not given replacement containers for the wound vac and thus the wound vac is overfilled with blood causing worsening pain.  No fever. Pt dc on daily lovenox injection. Ortho: dr kirk    No fever/chills, No photophobia/eye pain/changes in vision, No ear pain/sore throat/dysphagia, No chest pain/palpitations, no SOB/cough, no wheeze/stridor, No abdominal pain, No N/V/D, no dysuria/frequency/discharge, No neck/back pain, no rash, no changes in neurological status/function. + RT knee pain.

## 2020-11-14 NOTE — CONSULT NOTE ADULT - ASSESSMENT
70F POD2 R Revision TKA    Plan:   Pain control PRN  WBAT  Will discuss with Dr. Mao to determine if he wants to change sterile dressing as well as plan for further management; Will amend note after discussion with Dr. Mao.

## 2020-11-15 ENCOUNTER — TRANSCRIPTION ENCOUNTER (OUTPATIENT)
Age: 70
End: 2020-11-15

## 2020-11-15 LAB
ANION GAP SERPL CALC-SCNC: 4 MMOL/L — LOW (ref 5–17)
BUN SERPL-MCNC: 11 MG/DL — SIGNIFICANT CHANGE UP (ref 7–23)
CALCIUM SERPL-MCNC: 8.7 MG/DL — SIGNIFICANT CHANGE UP (ref 8.5–10.1)
CHLORIDE SERPL-SCNC: 105 MMOL/L — SIGNIFICANT CHANGE UP (ref 96–108)
CO2 SERPL-SCNC: 31 MMOL/L — SIGNIFICANT CHANGE UP (ref 22–31)
CREAT SERPL-MCNC: 0.66 MG/DL — SIGNIFICANT CHANGE UP (ref 0.5–1.3)
GLUCOSE SERPL-MCNC: 98 MG/DL — SIGNIFICANT CHANGE UP (ref 70–99)
HCT VFR BLD CALC: 28.6 % — LOW (ref 34.5–45)
HCV AB S/CO SERPL IA: 0.07 S/CO — SIGNIFICANT CHANGE UP (ref 0–0.99)
HCV AB SERPL-IMP: SIGNIFICANT CHANGE UP
HGB BLD-MCNC: 9 G/DL — LOW (ref 11.5–15.5)
MCHC RBC-ENTMCNC: 30.3 PG — SIGNIFICANT CHANGE UP (ref 27–34)
MCHC RBC-ENTMCNC: 31.5 GM/DL — LOW (ref 32–36)
MCV RBC AUTO: 96.3 FL — SIGNIFICANT CHANGE UP (ref 80–100)
NRBC # BLD: 0 /100 WBCS — SIGNIFICANT CHANGE UP (ref 0–0)
PLATELET # BLD AUTO: 211 K/UL — SIGNIFICANT CHANGE UP (ref 150–400)
POTASSIUM SERPL-MCNC: 4.5 MMOL/L — SIGNIFICANT CHANGE UP (ref 3.5–5.3)
POTASSIUM SERPL-SCNC: 4.5 MMOL/L — SIGNIFICANT CHANGE UP (ref 3.5–5.3)
RBC # BLD: 2.97 M/UL — LOW (ref 3.8–5.2)
RBC # FLD: 13.3 % — SIGNIFICANT CHANGE UP (ref 10.3–14.5)
SARS-COV-2 RNA SPEC QL NAA+PROBE: SIGNIFICANT CHANGE UP
SODIUM SERPL-SCNC: 140 MMOL/L — SIGNIFICANT CHANGE UP (ref 135–145)
WBC # BLD: 7.2 K/UL — SIGNIFICANT CHANGE UP (ref 3.8–10.5)
WBC # FLD AUTO: 7.2 K/UL — SIGNIFICANT CHANGE UP (ref 3.8–10.5)

## 2020-11-15 RX ORDER — ACETAMINOPHEN 500 MG
650 TABLET ORAL EVERY 6 HOURS
Refills: 0 | Status: DISCONTINUED | OUTPATIENT
Start: 2020-11-15 | End: 2020-11-16

## 2020-11-15 RX ORDER — ONDANSETRON 8 MG/1
4 TABLET, FILM COATED ORAL EVERY 6 HOURS
Refills: 0 | Status: DISCONTINUED | OUTPATIENT
Start: 2020-11-15 | End: 2020-11-16

## 2020-11-15 RX ORDER — ACETAMINOPHEN 500 MG
1000 TABLET ORAL ONCE
Refills: 0 | Status: COMPLETED | OUTPATIENT
Start: 2020-11-15 | End: 2021-10-14

## 2020-11-15 RX ORDER — ACETAMINOPHEN 500 MG
1000 TABLET ORAL ONCE
Refills: 0 | Status: COMPLETED | OUTPATIENT
Start: 2020-11-15 | End: 2020-11-16

## 2020-11-15 RX ADMIN — Medication 1 MILLIGRAM(S): at 11:20

## 2020-11-15 RX ADMIN — HYDROMORPHONE HYDROCHLORIDE 0.5 MILLIGRAM(S): 2 INJECTION INTRAMUSCULAR; INTRAVENOUS; SUBCUTANEOUS at 18:20

## 2020-11-15 RX ADMIN — HYDROMORPHONE HYDROCHLORIDE 0.5 MILLIGRAM(S): 2 INJECTION INTRAMUSCULAR; INTRAVENOUS; SUBCUTANEOUS at 01:06

## 2020-11-15 RX ADMIN — Medication 1 TABLET(S): at 11:20

## 2020-11-15 RX ADMIN — Medication 325 MILLIGRAM(S): at 05:18

## 2020-11-15 RX ADMIN — OXYCODONE HYDROCHLORIDE 5 MILLIGRAM(S): 5 TABLET ORAL at 14:30

## 2020-11-15 RX ADMIN — HYDROMORPHONE HYDROCHLORIDE 0.5 MILLIGRAM(S): 2 INJECTION INTRAMUSCULAR; INTRAVENOUS; SUBCUTANEOUS at 17:45

## 2020-11-15 RX ADMIN — MAGNESIUM HYDROXIDE 30 MILLILITER(S): 400 TABLET, CHEWABLE ORAL at 13:28

## 2020-11-15 RX ADMIN — Medication 100 MILLIGRAM(S): at 21:26

## 2020-11-15 RX ADMIN — Medication 325 MILLIGRAM(S): at 17:45

## 2020-11-15 RX ADMIN — HYDROMORPHONE HYDROCHLORIDE 0.5 MILLIGRAM(S): 2 INJECTION INTRAMUSCULAR; INTRAVENOUS; SUBCUTANEOUS at 00:49

## 2020-11-15 RX ADMIN — Medication 500 MILLIGRAM(S): at 05:18

## 2020-11-15 RX ADMIN — OXYCODONE HYDROCHLORIDE 5 MILLIGRAM(S): 5 TABLET ORAL at 13:28

## 2020-11-15 RX ADMIN — OXYCODONE HYDROCHLORIDE 5 MILLIGRAM(S): 5 TABLET ORAL at 07:20

## 2020-11-15 RX ADMIN — Medication 100 MILLIGRAM(S): at 13:17

## 2020-11-15 RX ADMIN — OXYCODONE HYDROCHLORIDE 5 MILLIGRAM(S): 5 TABLET ORAL at 06:24

## 2020-11-15 RX ADMIN — Medication 100 MILLIGRAM(S): at 05:18

## 2020-11-15 RX ADMIN — ONDANSETRON 4 MILLIGRAM(S): 8 TABLET, FILM COATED ORAL at 18:19

## 2020-11-15 RX ADMIN — Medication 500 MILLIGRAM(S): at 17:45

## 2020-11-15 NOTE — PROGRESS NOTE ADULT - SUBJECTIVE AND OBJECTIVE BOX
Orthopedics   Patient seen and examined at bedside. Pain is controlled. Pt feeling well, eating. No nausea or vomiting. No complaints at this time.    Vital Signs Last 24 Hrs  T(C): 36.7 (15 Nov 2020 05:00), Max: 36.9 (14 Nov 2020 23:57)  T(F): 98 (15 Nov 2020 05:00), Max: 98.5 (14 Nov 2020 23:57)  HR: 75 (15 Nov 2020 05:00) (68 - 86)  BP: 109/65 (15 Nov 2020 05:00) (109/65 - 126/60)  BP(mean): --  RR: 17 (15 Nov 2020 05:00) (16 - 18)  SpO2: 95% (15 Nov 2020 05:00) (95% - 99%)    Exam:  Gen: NAD, resting comfortably  RLE:  Dressing c/d/i; Prevena in place and working  +EHL/FHL/TA/GS  SILT L3-S1  +DP/PT   Calf NTTP b/l  Compartments soft and compressible    A/P:  70yFemale Stable  s/p R revision TKA    -FU AM labs  -Bedrest, WBAT in KI to the bathroom only  -Pain control PRN  -PT/OT  -DVT PE ppx: ASA  -Incentive spirometry  -Will discuss with Dr. Mao and advise if any changes to plan Orthopedics   Patient seen and examined at bedside. Pain is controlled. Pt feeling well, eating. No nausea or vomiting. No complaints at this time.    Vital Signs Last 24 Hrs  T(C): 36.7 (15 Nov 2020 05:00), Max: 36.9 (14 Nov 2020 23:57)  T(F): 98 (15 Nov 2020 05:00), Max: 98.5 (14 Nov 2020 23:57)  HR: 75 (15 Nov 2020 05:00) (68 - 86)  BP: 109/65 (15 Nov 2020 05:00) (109/65 - 126/60)  BP(mean): --  RR: 17 (15 Nov 2020 05:00) (16 - 18)  SpO2: 95% (15 Nov 2020 05:00) (95% - 99%)    Exam:  Gen: NAD, resting comfortably  RLE:  Dressing c/d/i; Prevena in place and working  +EHL/FHL/TA/GS  SILT L3-S1  +DP/PT   Calf NTTP b/l  Compartments soft and compressible    A/P:  70yFemale Stable  s/p R revision TKA    -FU AM labs  -WBAT in KI, no walking restrictions with PT  -Pain control PRN  -PT/OT  -DVT PE ppx: ASA  -Incentive spirometry  -Will discuss with Dr. Mao and advise if any changes to plan

## 2020-11-15 NOTE — DISCHARGE NOTE PROVIDER - CARE PROVIDER_API CALL
Howard Mao  ORTHOPAEDIC SURGERY  [address] [place] [address]  Phone: [phone]  Fax: [phone]  Follow Up Time:

## 2020-11-15 NOTE — DISCHARGE NOTE PROVIDER - NSDCMRMEDTOKEN_GEN_ALL_CORE_FT
aspirin 81 mg oral delayed release tablet: 1 tab(s) orally 2 times a day   Please start post operative day 15 and continue for 30 days MDD:2  Colace 100 mg oral capsule: 1 cap(s) orally 3 times a day, As Needed MDD:3  Lovenox 40 mg/0.4 mL injectable solution: 40 milligram(s) subcutaneously once a day   Take for 14 days until post operative day 15 MDD:1  Percocet 5 mg-325 mg oral tablet: 1 tab(s) orally every 6 hours, As Needed -for moderate pain MDD:4  Vitamin D3 5000 intl units (125 mcg) oral tablet: orally once a day  ZyrTEC 10 mg oral tablet: 1 tab(s) orally once a day   Colace 100 mg oral capsule: 1 cap(s) orally 3 times a day, As Needed MDD:3  Ecotrin 325 mg oral delayed release tablet: 1 tab(s) orally 2 times a day . For DVT prophylaxis do not skip doses.   Percocet 5 mg-325 mg oral tablet: 1 tab(s) orally every 6 hours, As Needed -for moderate pain MDD:4  Vitamin D3 5000 intl units (125 mcg) oral tablet: orally once a day  Zofran 4 mg oral tablet: 1 tab(s) orally every 6 hours, As Needed -for nausea   ZyrTEC 10 mg oral tablet: 1 tab(s) orally once a day

## 2020-11-15 NOTE — DISCHARGE NOTE PROVIDER - HOSPITAL COURSE
The patient is a 70 year old female status post elective revision total knee Arthroplasty to the right knee on 11/12. The patient returned to [facility] ED due to uncontrolled postoperative pain and saturated surgical dressing. The patient was taking Lovenox for anticoagulation during the postop period. On readmission lovenox was discontinued and the patient was placed Aspirin. All home medications were continued. The patient received physical therapy daily and daily labs were followed. The dressing was changed on readmission and kept clean, dry, intact. The rest of the hospital stay was unremarkable.

## 2020-11-15 NOTE — DISCHARGE NOTE PROVIDER - NSDCFUADDAPPT_GEN_ALL_CORE_FT
1. Pain Control: take pain medications as prescribed and as needed.  2. Walking with full weight bearing as tolerated, with assistive devices (walker/cane as needed)  3. DVT Prophylaxis: Aspirin 325mg twice a day for 30 days. Do NOT skip doses.  4. PT as needed  5. Follow up with Dr. Mao as outpatient in 10-14 days after discharge from the hospital or rehab. Please call office for appointment ([phone])  6. Keep dressing clean and dry.  7. Remove dressing on post-operative day #10, with daily dressing changes as needed. Remove staples post-operative day #21.  8. Ice/elevate affected area as needed. 1. Pain Control: take pain medications as prescribed and as needed.  2. Walking with full weight bearing as tolerated, with assistive devices (walker/cane as needed).   3. DVT Prophylaxis: Aspirin 325mg twice a day for 30 days. Do NOT skip doses.  4. PT as needed  5. Follow up with Dr. Mao as outpatient in 10-14 days after discharge from the hospital or rehab. Please call office for appointment ([phone])  6. Keep dressing clean and dry.  7. Remove dressing on post-operative day #10, with daily dressing changes as needed. Remove staples post-operative day #21.  8. Ice/elevate affected area as needed. 1. Pain Control: take pain medications as prescribed and as needed.  2. Walking with full weight bearing as tolerated, with assistive devices (walker/cane as needed).   3. DVT Prophylaxis: Aspirin 325mg twice a day for 30 days. Do NOT skip doses.  4. PT as needed  5. Follow up with Dr. Mao as outpatient in 21 days after discharge from the hospital or rehab. Please call office for appointment ([phone]) for staple removal   6. Keep dressing clean and dry.  7. Remove dressing on post-operative day #10, with daily dressing changes as needed. Remove staples post-operative day #21.  8. Ice/elevate affected area as needed.

## 2020-11-16 ENCOUNTER — TRANSCRIPTION ENCOUNTER (OUTPATIENT)
Age: 70
End: 2020-11-16

## 2020-11-16 VITALS
HEART RATE: 96 BPM | TEMPERATURE: 98 F | DIASTOLIC BLOOD PRESSURE: 63 MMHG | SYSTOLIC BLOOD PRESSURE: 124 MMHG | OXYGEN SATURATION: 97 % | RESPIRATION RATE: 16 BRPM

## 2020-11-16 LAB
ANION GAP SERPL CALC-SCNC: 4 MMOL/L — LOW (ref 5–17)
BUN SERPL-MCNC: 13 MG/DL — SIGNIFICANT CHANGE UP (ref 7–23)
CALCIUM SERPL-MCNC: 8.4 MG/DL — LOW (ref 8.5–10.1)
CHLORIDE SERPL-SCNC: 108 MMOL/L — SIGNIFICANT CHANGE UP (ref 96–108)
CO2 SERPL-SCNC: 29 MMOL/L — SIGNIFICANT CHANGE UP (ref 22–31)
CREAT SERPL-MCNC: 0.62 MG/DL — SIGNIFICANT CHANGE UP (ref 0.5–1.3)
GLUCOSE SERPL-MCNC: 91 MG/DL — SIGNIFICANT CHANGE UP (ref 70–99)
HCT VFR BLD CALC: 27.6 % — LOW (ref 34.5–45)
HGB BLD-MCNC: 9.1 G/DL — LOW (ref 11.5–15.5)
MCHC RBC-ENTMCNC: 31.2 PG — SIGNIFICANT CHANGE UP (ref 27–34)
MCHC RBC-ENTMCNC: 33 GM/DL — SIGNIFICANT CHANGE UP (ref 32–36)
MCV RBC AUTO: 94.5 FL — SIGNIFICANT CHANGE UP (ref 80–100)
NRBC # BLD: 0 /100 WBCS — SIGNIFICANT CHANGE UP (ref 0–0)
PLATELET # BLD AUTO: 200 K/UL — SIGNIFICANT CHANGE UP (ref 150–400)
POTASSIUM SERPL-MCNC: 4.7 MMOL/L — SIGNIFICANT CHANGE UP (ref 3.5–5.3)
POTASSIUM SERPL-SCNC: 4.7 MMOL/L — SIGNIFICANT CHANGE UP (ref 3.5–5.3)
RBC # BLD: 2.92 M/UL — LOW (ref 3.8–5.2)
RBC # FLD: 13 % — SIGNIFICANT CHANGE UP (ref 10.3–14.5)
SARS-COV-2 IGG SERPL QL IA: NEGATIVE — SIGNIFICANT CHANGE UP
SARS-COV-2 IGM SERPL IA-ACNC: 0.22 RATIO — SIGNIFICANT CHANGE UP
SODIUM SERPL-SCNC: 141 MMOL/L — SIGNIFICANT CHANGE UP (ref 135–145)
SURGICAL PATHOLOGY STUDY: SIGNIFICANT CHANGE UP
WBC # BLD: 7.18 K/UL — SIGNIFICANT CHANGE UP (ref 3.8–10.5)
WBC # FLD AUTO: 7.18 K/UL — SIGNIFICANT CHANGE UP (ref 3.8–10.5)

## 2020-11-16 RX ORDER — ASPIRIN/CALCIUM CARB/MAGNESIUM 324 MG
1 TABLET ORAL
Qty: 84 | Refills: 0
Start: 2020-11-16 | End: 2020-12-27

## 2020-11-16 RX ORDER — METOCLOPRAMIDE HCL 10 MG
10 TABLET ORAL EVERY 6 HOURS
Refills: 0 | Status: DISCONTINUED | OUTPATIENT
Start: 2020-11-16 | End: 2020-11-16

## 2020-11-16 RX ORDER — ONDANSETRON 8 MG/1
1 TABLET, FILM COATED ORAL
Qty: 56 | Refills: 0
Start: 2020-11-16 | End: 2020-11-29

## 2020-11-16 RX ADMIN — ONDANSETRON 4 MILLIGRAM(S): 8 TABLET, FILM COATED ORAL at 04:58

## 2020-11-16 RX ADMIN — Medication 100 MILLIGRAM(S): at 05:04

## 2020-11-16 RX ADMIN — OXYCODONE HYDROCHLORIDE 10 MILLIGRAM(S): 5 TABLET ORAL at 10:00

## 2020-11-16 RX ADMIN — Medication 10 MILLIGRAM(S): at 08:41

## 2020-11-16 RX ADMIN — Medication 650 MILLIGRAM(S): at 12:16

## 2020-11-16 RX ADMIN — OXYCODONE HYDROCHLORIDE 10 MILLIGRAM(S): 5 TABLET ORAL at 09:17

## 2020-11-16 RX ADMIN — Medication 1000 MILLIGRAM(S): at 05:13

## 2020-11-16 RX ADMIN — Medication 325 MILLIGRAM(S): at 05:04

## 2020-11-16 RX ADMIN — Medication 1 TABLET(S): at 12:15

## 2020-11-16 RX ADMIN — Medication 400 MILLIGRAM(S): at 04:58

## 2020-11-16 RX ADMIN — Medication 1 MILLIGRAM(S): at 12:15

## 2020-11-16 RX ADMIN — Medication 500 MILLIGRAM(S): at 05:04

## 2020-11-16 RX ADMIN — Medication 650 MILLIGRAM(S): at 13:15

## 2020-11-16 NOTE — PHYSICAL THERAPY INITIAL EVALUATION ADULT - GENERAL OBSERVATIONS, REHAB EVAL
Patient resting, semi-fowlers in bed. Ace wrap and prevena vac on R knee with knee immobilizer on. Seen for PT evaluation.

## 2020-11-16 NOTE — PROGRESS NOTE ADULT - SUBJECTIVE AND OBJECTIVE BOX
Orthopedics   Patient seen and examined at bedside. Pain is controlled. Pt feeling well, eating. No nausea or vomiting. No complaints at this time.    Vital Signs Last 24 Hrs  T(C): 36.6 (16 Nov 2020 05:00), Max: 36.9 (15 Nov 2020 23:32)  T(F): 97.8 (16 Nov 2020 05:00), Max: 98.4 (15 Nov 2020 23:32)  HR: 81 (16 Nov 2020 05:00) (78 - 93)  BP: 124/77 (16 Nov 2020 05:00) (96/60 - 124/77)  BP(mean): --  RR: 16 (16 Nov 2020 05:00) (16 - 18)  SpO2: 95% (16 Nov 2020 05:00) (94% - 96%)    Exam:  Gen: NAD, resting comfortably  RLE:  Dressing c/d/i; Prevena in place and working  +EHL/FHL/TA/GS  SILT L3-S1  +DP/PT   Calf NTTP b/l  Compartments soft and compressible    A/P:  70yFemale Stable  s/p R revision TKA    -FU AM labs  -WBAT in KI, no walking restrictions with PT, no ROM restrictions with PT  -Pain control PRN  -PT/OT  -DVT PE ppx: ASA  -Incentive spirometry  -Will discuss with Dr. Mao and advise if any changes to plan

## 2020-11-16 NOTE — DISCHARGE NOTE NURSING/CASE MANAGEMENT/SOCIAL WORK - PATIENT PORTAL LINK FT
You can access the FollowMyHealth Patient Portal offered by [facility] by registering at the following website: [email or web address]. By joining [facility]’s FollowMyHealth portal, you will also be able to view your health information using other applications (apps) compatible with our system.

## 2020-11-16 NOTE — PHYSICAL THERAPY INITIAL EVALUATION ADULT - BALANCE TRAINING, PT EVAL
GOAL: To enable safe transfers, gait and ADLs with appropriate mobility/adaptive devices and supervision without loss of balance.

## 2020-11-16 NOTE — PHYSICAL THERAPY INITIAL EVALUATION ADULT - ADDITIONAL COMMENTS
Per patient, lives in private home with . Home has 3 stairs steps to enter with no handrails. Patient will remain on first floor while recovering from surgery. Bathroom is on 1st floor with walk in shower, fixed shower had and grab bars. Patient has RW, crutches and 3:1 commode from previous surgeries. As per post operative PT evaluation on 11/12/2020, patient was independent with mobility prior to surgery. Patient denies falls in the past 6 months and denies buckling at the knees.

## 2020-11-16 NOTE — PHYSICAL THERAPY INITIAL EVALUATION ADULT - PERTINENT HX OF CURRENT PROBLEM, REHAB EVAL
Patient had scheduled for a R total knee revision due to pain in prosthetic joint on 11/12/2020. Patient initially discharged POD 1 on 11/13/2020. Patient returned to ED via EMS on 11/14/2020 due to post operative pain and prevena vac overfilling due to excessive bleeding. Bedrest order discontinued 11/15/2020, patient was provided a knee immobilizer.

## 2020-11-16 NOTE — DISCHARGE NOTE NURSING/CASE MANAGEMENT/SOCIAL WORK - NSDCFUADDAPPT_GEN_ALL_CORE_FT
1. Pain Control: take pain medications as prescribed and as needed.  2. Walking with full weight bearing as tolerated, with assistive devices (walker/cane as needed).   3. DVT Prophylaxis: Aspirin 325mg twice a day for 30 days. Do NOT skip doses.  4. PT as needed  5. Follow up with Dr. Mao as outpatient in 10-14 days after discharge from the hospital or rehab. Please call office for appointment ([phone])  6. Keep dressing clean and dry.  7. Remove dressing on post-operative day #10, with daily dressing changes as needed. Remove staples post-operative day #21.  8. Ice/elevate affected area as needed.

## 2020-11-16 NOTE — PHYSICAL THERAPY INITIAL EVALUATION ADULT - GAIT DISTANCE, PT EVAL
Patient: Yissel Coley    Procedure(s):  Dilation and Curettage with ultrasound - Wound Class: II-Clean Contaminated    Diagnosis:Post menopausal bleeding  Diagnosis Additional Information: Post menopausal bleeding    Anesthesia Type:  General, LMA    Note:  Anesthesia Post Evaluation    Patient location during evaluation: PACU  Patient participation: Able to fully participate in evaluation  Level of consciousness: awake and alert  Pain management: adequate  Airway patency: patent  Cardiovascular status: acceptable  Respiratory status: acceptable  Hydration status: acceptable  PONV: none     Anesthetic complications: None          Last vitals:  Vitals:    09/19/17 1135 09/19/17 1143 09/19/17 1200   BP:  139/79 147/70   Resp:  12 15   Temp:  97.8  F (36.6  C)    SpO2: 99% 100% 100%         Electronically Signed By: Aba Burgess MD  September 19, 2017  12:46 PM   How Many Skin Cancers Have You Had?: more than one What Is The Reason For Today's Visit?: Surveillance against skin cancer recurrences When Was Your Last Cancer Diagnosed?: R shoulder [facility] 2014 200 feet

## 2020-11-18 DIAGNOSIS — T84.84XA PAIN DUE TO INTERNAL ORTHOPEDIC PROSTHETIC DEVICES, IMPLANTS AND GRAFTS, INITIAL ENCOUNTER: ICD-10-CM

## 2020-11-18 DIAGNOSIS — Z88.0 ALLERGY STATUS TO PENICILLIN: ICD-10-CM
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
## 2020-11-18 DIAGNOSIS — T84.032A MECHANICAL LOOSENING OF INTERNAL RIGHT KNEE PROSTHETIC JOINT, INITIAL ENCOUNTER: ICD-10-CM

## 2020-11-19 DIAGNOSIS — M96.830 POSTPROCEDURAL HEMORRHAGE OF A MUSCULOSKELETAL STRUCTURE FOLLOWING A MUSCULOSKELETAL SYSTEM PROCEDURE: ICD-10-CM
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
## 2020-11-19 DIAGNOSIS — G89.18 OTHER ACUTE POSTPROCEDURAL PAIN: ICD-10-CM

## 2020-11-19 DIAGNOSIS — Z88.0 ALLERGY STATUS TO PENICILLIN: ICD-10-CM

## 2020-11-19 DIAGNOSIS — Z79.82 LONG TERM (CURRENT) USE OF ASPIRIN: ICD-10-CM

## 2020-11-19 DIAGNOSIS — Z79.01 LONG TERM (CURRENT) USE OF ANTICOAGULANTS: ICD-10-CM
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
## 2020-11-19 DIAGNOSIS — M25.562 PAIN IN LEFT KNEE: ICD-10-CM

## 2020-11-24 RX ORDER — OXYCODONE AND ACETAMINOPHEN 5; 325 MG/1; MG/1
5-325 TABLET ORAL
Qty: 40 | Refills: 0 | Status: ACTIVE | COMMUNITY
Start: 2020-11-24 | End: 1900-01-01

## 2020-11-27 LAB
CULTURE RESULTS: SIGNIFICANT CHANGE UP
SPECIMEN SOURCE: SIGNIFICANT CHANGE UP

## 2020-12-04 ENCOUNTER — APPOINTMENT (OUTPATIENT)
Dept: ORTHOPEDIC SURGERY | Facility: CLINIC | Age: 70
End: 2020-12-04
Payer: MEDICARE

## 2020-12-04 VITALS — BODY MASS INDEX: 31.28 KG/M2 | WEIGHT: 170 LBS | HEIGHT: 62 IN

## 2020-12-04 PROCEDURE — 99024 POSTOP FOLLOW-UP VISIT: CPT

## 2020-12-04 NOTE — HISTORY OF PRESENT ILLNESS
[Clean/Dry/Intact] : clean, dry and intact [Healed] : healed [Doing Well] : is doing well [Excellent Pain Control] : has excellent pain control [No Sign of Infection] : is showing no signs of infection [Staples Removed] : staples were removed [Chills] : no chills [Constipation] : no constipation [Diarrhea] : no diarrhea [Dysuria] : no dysuria [Fever] : no fever [Nausea] : no nausea [Vomiting] : no vomiting [Erythema] : not erythematous [Discharge] : absent of discharge [Swelling] : not swollen [Dehiscence] : not dehisced [de-identified] : S/P Right Total Knee replacement for suture removal [de-identified] : Patient presents today 3 weeks s/p Right TKR Revision for staple removal. Patient states that she is doing well, taking Tylenol / oxycodone for pain, aspirin for anticoagulation and doing therapy. She also admits that she seen her PCP who put her on Z-pack for swollen gums and did the Sonogram of her neck do to swollen glands. She is going to discuss the results with him today.  I went over the operative report with the patient and she  received a copy of the report for their own records. She states that she has much less pain in her knee comparing to prior her surgery and she is very happy with the results.\par  [de-identified] : Right Knee ROM 0-85. [de-identified] : Continue PT, pain control, aspirin for anticoagulation and follow up in 4 weeks with xrays

## 2020-12-04 NOTE — PROCEDURE
[de-identified] : Right  Knee sutures completely removed \par Observation on incision dry, clean, intact, well healed. Suture site Cleaned with iodine swab after sutures are completely removed. Instructions Keep incision dry and clean, allowed to shower and pat site dry, do not rub dry, contact office is site becomes red, swollen, infected, or you develop a fever.\par

## 2020-12-15 RX ORDER — OXYCODONE AND ACETAMINOPHEN 5; 325 MG/1; MG/1
5-325 TABLET ORAL
Qty: 40 | Refills: 0 | Status: ACTIVE | COMMUNITY
Start: 2020-12-15 | End: 1900-01-01

## 2021-01-06 ENCOUNTER — APPOINTMENT (OUTPATIENT)
Dept: ORTHOPEDIC SURGERY | Facility: CLINIC | Age: 71
End: 2021-01-06
Payer: MEDICARE

## 2021-01-06 DIAGNOSIS — Z96.659 OTHER MECHANICAL COMPLICATION OF OTHER INTERNAL JOINT PROSTHESIS, INITIAL ENCOUNTER: ICD-10-CM

## 2021-01-06 DIAGNOSIS — Z47.1 AFTERCARE FOLLOWING JOINT REPLACEMENT SURGERY: ICD-10-CM

## 2021-01-06 DIAGNOSIS — Z96.651 AFTERCARE FOLLOWING JOINT REPLACEMENT SURGERY: ICD-10-CM

## 2021-01-06 DIAGNOSIS — T84.098A OTHER MECHANICAL COMPLICATION OF OTHER INTERNAL JOINT PROSTHESIS, INITIAL ENCOUNTER: ICD-10-CM

## 2021-01-06 PROCEDURE — 99024 POSTOP FOLLOW-UP VISIT: CPT

## 2021-01-06 PROCEDURE — 73562 X-RAY EXAM OF KNEE 3: CPT | Mod: RT

## 2021-01-06 PROCEDURE — 73560 X-RAY EXAM OF KNEE 1 OR 2: CPT | Mod: LT

## 2021-01-06 RX ORDER — OXYCODONE AND ACETAMINOPHEN 5; 325 MG/1; MG/1
5-325 TABLET ORAL
Qty: 40 | Refills: 0 | Status: ACTIVE | COMMUNITY
Start: 2021-01-06 | End: 1900-01-01

## 2021-01-06 NOTE — ADDENDUM
[FreeTextEntry1] : This note was written by Tom Oakley on 01/06/2021 acting as scribe for Dr. Howard Mao M.D.\par \par I, Dr. Howard Mao, have read and attest that all the information, medical decision making and discharge instructions within are true and accurate.

## 2021-01-06 NOTE — REASON FOR VISIT
[Follow-Up Visit] : a follow-up visit for [Artificial Knee Joint] : artificial knee joint [Knee Pain] : knee pain [Other: ____] : [unfilled] [FreeTextEntry2] : s/p right revision total knee replacement

## 2021-01-06 NOTE — HISTORY OF PRESENT ILLNESS
[de-identified] : KADEN BALDERAS is a 70 year old female who presents for follow-up evaluation s/p right revision total knee replacement at 7 weeks. She is attending PT 2-3x/week. She reports that she is doing well, and making progress. She was last measured at PT with a ROM of 0-94. Patient states that she has finished her course of anticoagulation. She is currently taking 5 mg Percocet, but reports that it is not helping as much as she would like. She states that she has a stitch granuloma on the distal aspect of her incision in which she has put topical antibiotic treatment on it with improvement.

## 2021-01-06 NOTE — PHYSICAL EXAM
[de-identified] : General appearance: well nourished and hydrated, pleasant, alert and oriented x 3, cooperative.\par HEENT: Normocephalic, EOM intact, Nasal septum midline, Oral cavity clear, External auditory canal clear.\par Cardiovascular: no apparent abnormalities, no lower leg edema, no varicosities, pedal pulses are palpable.\par Lymphatics Lymph nodes: none palpated, Lymphedema: not present.\par Neurologic: sensation is normal, no muscle weakness in upper or lower extremities, patella tendon reflexes intact .\par Dermatologic no apparent skin lesions, moist, warm, no rash.\par Spine:cervical spine appears normal and moves freely, thoracic spine appears normal and moves freely, lumbosacral spine appears normal and moves freely.\par Gait: nonantalgic. \par \par Right Knee\par Inspection: no effusion, small stitch granuloma on the distal aspect of incision, no erythema or drainage \par Wounds: healed midline incision\par Alignment: normal.\par Palpation: no specific tenderness on palpation.\par ROM: Active (in degrees): 0-90\par Ligamentous laxity (neg): negative ant. drawer test, negative post. drawer test, stable to varus stress test, stable to valgus stress test,\par Patellofemoral Alignment Test: Q angle-, normal.\par Muscle Test: good quad strength.\par Leg examination: calf is soft and non-tender.  [de-identified] : Right knee xray, AP, lateral, merchant view taken at the office today demonstrates a revision total knee replacement in satisfactory position and alignment. No evidence of loosening. The patella sits in a central position. \par \par Left knee xray merchant view taken at the office today demonstrates patella sits at an appropriate height in a central position, mild joint space narrowing, osteophytes, patellofemoral arthritis

## 2021-01-06 NOTE — DISCUSSION/SUMMARY
[de-identified] : Patient is doing well following their s/p right revision total knee replacement at 7 weeks. I reviewed x-rays with them. I have reassured her that her implants are functioning well. \par \par She is doing much better following surgery, and with physical therapy as supposed to her index procedure. \par \par She is encouraged to stay active, and continue with her home exercise program, and physical therapy. We have provided a prescription for physical therapy. She does have a small stitch granuloma on the distal aspect of incision. She is encouraged to utilize topical antibiotic treatment on the incision site. \par \par She can continue activities as tolerated. All questions answered, understanding verbalized. Patient in agreement with plan of care. Patient may follow up with x-rays in 6-8 weeks.

## 2021-01-27 RX ORDER — OXYCODONE AND ACETAMINOPHEN 5; 325 MG/1; MG/1
5-325 TABLET ORAL
Qty: 40 | Refills: 0 | Status: ACTIVE | COMMUNITY
Start: 2021-01-27 | End: 1900-01-01

## 2021-02-24 ENCOUNTER — APPOINTMENT (OUTPATIENT)
Dept: ORTHOPEDIC SURGERY | Facility: CLINIC | Age: 71
End: 2021-02-24
Payer: MEDICARE

## 2021-02-24 VITALS — WEIGHT: 174 LBS | BODY MASS INDEX: 30.83 KG/M2 | HEIGHT: 63 IN

## 2021-02-24 PROCEDURE — 99212 OFFICE O/P EST SF 10 MIN: CPT

## 2021-02-24 PROCEDURE — 73560 X-RAY EXAM OF KNEE 1 OR 2: CPT | Mod: LT

## 2021-02-24 PROCEDURE — 73562 X-RAY EXAM OF KNEE 3: CPT | Mod: RT

## 2021-02-24 NOTE — REASON FOR VISIT
[Follow-Up Visit] : a follow-up visit for [Knee Pain] : knee pain [Artificial Knee Joint] : artificial knee joint [Other: ____] : [unfilled] [FreeTextEntry2] : s/p right revision total knee replacement

## 2021-02-24 NOTE — HISTORY OF PRESENT ILLNESS
[de-identified] : KADEN BALDERAS is a 70 year old female who presents for follow-up evaluation s/p right revision total knee replacement at 14 weeks, 11/12/2020. Patient states she continues going to PT 2-3x a week. She reports her ROM is approximately 0-115 °. She denies any wound complications. She uses occasional NSAIDs prn for the pain and swelling. She reports she is slowly improving. She is not walking with any assisted devices. She states the instability she felt prior to surgery is now gone.\par

## 2021-02-24 NOTE — DISCUSSION/SUMMARY
[de-identified] : Patient is doing well following their s/p right revision total knee replacement at 14 weeks. I reviewed x-rays with them. I have reassured her that her implants are functioning well. \par \par She is doing much better following surgery. She is encouraged to stay active, and continue with her home exercise program, and physical therapy. She can continue activities as tolerated. \par \par All questions answered, understanding verbalized. Patient in agreement with plan of care. Patient may follow up with x-rays in 3 months

## 2021-02-24 NOTE — PHYSICAL EXAM
[de-identified] : General appearance: well nourished and hydrated, pleasant, alert and oriented x 3, cooperative.\par HEENT: Normocephalic, EOM intact, Nasal septum midline, Oral cavity clear, External auditory canal clear.\par Cardiovascular: no apparent abnormalities, no lower leg edema, no varicosities, pedal pulses are palpable.\par Lymphatics Lymph nodes: none palpated, Lymphedema: not present.\par Neurologic: sensation is normal, no muscle weakness in upper or lower extremities, patella tendon reflexes intact .\par Dermatologic no apparent skin lesions, moist, warm, no rash.\par Spine:cervical spine appears normal and moves freely, thoracic spine appears normal and moves freely, lumbosacral spine appears normal and moves freely.\par Gait: nonantalgic. \par \par Right Knee\par Inspection: no effusion, small stitch granuloma on the distal aspect of incision, no erythema or drainage \par Wounds: healed midline incision\par Alignment: normal.\par Palpation: no specific tenderness on palpation.\par ROM: Active (in degrees): 0- 105 ° \par Ligamentous laxity (neg): negative ant. drawer test, negative post. drawer test, stable to varus stress test, stable to valgus stress test,\par Patellofemoral Alignment Test: Q angle-, normal.\par Muscle Test: good quad strength.\par Leg examination: calf is soft and non-tender.  [de-identified] : Right knee xray, AP, lateral, merchant view taken at the office today demonstrates a revision total knee replacement in satisfactory position and alignment. No evidence of loosening. The patella sits in a central position. \par \par Left knee xray merchant view taken at the office today demonstrates patella sits at an appropriate height in a central position, mild joint space narrowing, osteophytes, patellofemoral arthritis.

## 2021-02-24 NOTE — ADDENDUM
[FreeTextEntry1] : This note was written by Hugh Birch on 02/24/2021  acting as scribe for Dr. Howard Mao M.D.\par \par I, Dr. Howard Mao, have read and attest that all the information, medical decision making and discharge instructions within are true and accurate.

## 2021-05-26 ENCOUNTER — APPOINTMENT (OUTPATIENT)
Dept: ORTHOPEDIC SURGERY | Facility: CLINIC | Age: 71
End: 2021-05-26
Payer: MEDICARE

## 2021-05-26 VITALS — WEIGHT: 174 LBS | BODY MASS INDEX: 30.83 KG/M2 | HEIGHT: 63 IN

## 2021-05-26 PROCEDURE — 99213 OFFICE O/P EST LOW 20 MIN: CPT

## 2021-05-26 PROCEDURE — 73562 X-RAY EXAM OF KNEE 3: CPT | Mod: RT

## 2021-05-26 PROCEDURE — 73560 X-RAY EXAM OF KNEE 1 OR 2: CPT | Mod: LT

## 2021-05-26 NOTE — DISCUSSION/SUMMARY
[de-identified] : Discussed at length the nature of the patient’s condition. Their right revsion TKR is doing excellent and have reassured her that her implants are functioning well.  I reviewed x-rays with them. Will have patient continue with a home exercise program. They can continue activities as tolerated. Will have patient follow up with me here in the clinic in 6 months with xrays of both knees since the left knee is symptomatic secondary to her degenerative arthritis

## 2021-05-26 NOTE — HISTORY OF PRESENT ILLNESS
[de-identified] : 70 year old female presents for follow-up evaluation s/p right revision total knee replacement at 6 months. The patient states she is doing well , biking for for exercise, and walking 1 mile a day as well. She denies taking any pain medications at the moment. The patient just wants to make sure that her implants are functioning well. Of note, she does have continued swelling in the knee.

## 2021-05-26 NOTE — PHYSICAL EXAM
[de-identified] : General appearance: well nourished and hydrated, pleasant, alert and oriented x 3, cooperative.\par HEENT: Normocephalic, EOM intact, Nasal septum midline, Oral cavity clear, External auditory canal clear.\par Cardiovascular: no apparent abnormalities, no lower leg edema, no varicosities, pedal pulses are palpable.\par Lymphatics Lymph nodes: none palpated, Lymphedema: not present.\par Neurologic: sensation is normal, no muscle weakness in upper or lower extremities, patella tendon reflexes intact .\par Dermatologic no apparent skin lesions, moist, warm, no rash.\par Spine:cervical spine appears normal and moves freely, thoracic spine appears normal and moves freely, lumbosacral spine appears normal and moves freely.\par Gait: nonantalgic. \par \par Right Knee\par Inspection: no effusion, small stitch granuloma on the distal aspect of incision, no erythema or drainage \par Wounds: healed midline incision\par Alignment: normal.\par Palpation: no specific tenderness on palpation.\par ROM: Active (in degrees): 0- 110°, no instability and good strength  \par Ligamentous laxity (neg): negative ant. drawer test, negative post. drawer test, stable to varus stress test, stable to valgus stress test,\par Patellofemoral Alignment Test: Q angle-, normal.\par Muscle Test: good quad strength.\par Leg examination: calf is soft and non-tender.  [de-identified] : Right knee xray, 3 views  AP, lateral, merchant view taken at the office today demonstrates a revision total knee replacement in satisfactory position and alignment. No evidence of loosening. The patella sits in a central position. \par \par Left knee xray merchant view taken at the office today demonstrates patella sits at an appropriate height in a central position, mild joint space narrowing, osteophytes, patellofemoral arthritis.

## 2021-05-26 NOTE — ADDENDUM
[FreeTextEntry1] : This note was written by Alvin Chaney on 05/26/2021 acting scribe for Dr. Howard Mao M.D.\par \par I Dr. Howard Mao have read and attest that all the information, medical decision making, and discharge instructions within are true and accurate.

## 2021-11-10 ENCOUNTER — APPOINTMENT (OUTPATIENT)
Dept: ORTHOPEDIC SURGERY | Facility: CLINIC | Age: 71
End: 2021-11-10
Payer: MEDICARE

## 2021-11-10 VITALS — BODY MASS INDEX: 30.83 KG/M2 | WEIGHT: 174 LBS | HEIGHT: 63 IN

## 2021-11-10 PROCEDURE — 99213 OFFICE O/P EST LOW 20 MIN: CPT

## 2021-11-10 PROCEDURE — 73564 X-RAY EXAM KNEE 4 OR MORE: CPT | Mod: LT

## 2021-11-10 PROCEDURE — 73562 X-RAY EXAM OF KNEE 3: CPT | Mod: RT

## 2021-11-10 NOTE — REASON FOR VISIT
[Follow-Up Visit] : a follow-up visit for [FreeTextEntry2] : s/p right revision TKR and left knee pain

## 2021-11-10 NOTE — DISCUSSION/SUMMARY
[de-identified] : Patient is doing well s/p right revision TKR at 1 year. Her left arthritic knee is intermittently symptomatic at this time. She does understand that eventually she will nee a left TKR done. Therefore we will continue with a non operative course of treatment. I recommended home exercises, weight loss and Tylenol prn. Patient can continue activities as tolerated. All questions answered, understanding verbalized. Patient in agreement with plan of care. Patient may follow up in 1 year with xrays. \par \par I did refer her to Dr. Jain.

## 2021-11-10 NOTE — HISTORY OF PRESENT ILLNESS
[de-identified] : KADEN BALDERAS  is a 71 year old female who presents for follow up evaluation s/p right revision TKR at 1 year. Her left knee is symptomatic. She is not ready for surgery and is not interested in receiving injections.

## 2021-11-10 NOTE — ADDENDUM
[FreeTextEntry1] : This note was written by Milana Jade on 11/10/2021 acting as scribe for Dr. Howard Mao M.D.\par \par I, Dr. Howard Mao, have read and attest that all the information, medical decision making and discharge instructions within are true and accurate.

## 2021-11-10 NOTE — PHYSICAL EXAM
[de-identified] : General appearance: well nourished and hydrated, pleasant, alert and oriented x 3, cooperative.\par HEENT: Normocephalic, EOM intact, Nasal septum midline, Oral cavity clear, External auditory canal clear.\par Cardiovascular: no apparent abnormalities, no lower leg edema, no varicosities, pedal pulses are palpable.\par Lymphatics Lymph nodes: none palpated, Lymphedema: not present.\par Neurologic: sensation is normal, no muscle weakness in upper or lower extremities, patella tendon reflexes intact .\par Dermatologic no apparent skin lesions, moist, warm, no rash.\par Spine:cervical spine appears normal and moves freely, thoracic spine appears normal and moves freely, lumbosacral spine appears normal and moves freely.\par Gait: nonantalgic.\par \par Left knee\par Inspection: joint effusion\par Wounds: none.\par Alignment: 5 degrees varus\par Palpation: medial tenderness on palpation.\par ROM active (in degrees): 5-125 with crepitus and pain\par Ligamentous laxity: all ligaments appear stable,, negative ant. drawer test, negative post. drawer test, stable to varus stress test, stable to valgus stress test. negative Lachman's test, negative pivot shift test\par Meniscal Test: negative McMurrays, negative Aliza.\par Patellofemoral Alignment Test: Q angle-, normal.\par Muscle Test: good quad strength.\par \par Right knee\par Inspection: no effusion or erythema.\par Wounds: healed midline incision\par Alignment: normal.\par Palpation: no specific tenderness on palpation.\par ROM active (in degrees): 0-110\par Ligamentous laxity: all ligaments appear stable,, negative ant. drawer test, negative post. drawer test, stable to varus stress test, stable to valgus stress test. negative Lachman's test, negative pivot shift test\par Meniscal Test: negative McMurrays, negative Aliza.\par Patellofemoral Alignment Test: Q angle-, normal.\par Muscle Test: good quad strength. [de-identified] : Right knee xray, AP, lateral, merchant view taken at the office today demonstrates a revision total knee replacement in satisfactory position and alignment. No evidence of loosening. The patella sits in a central position.\par \par Left knee xrays, standing AP/Lateral and Merchant films, and 45 degree PA standing view, taken at the office today shows diffuse tricompartmental degenerative arthritis, medial joint space narrowing, marginal osteophytes, bone on bone, sclerosis, patellofemoral joint space narrowing, peripheral osteophytes, Kellgren and Dexter grade 4, mild varus deformity

## 2022-06-10 ENCOUNTER — APPOINTMENT (OUTPATIENT)
Dept: ORTHOPEDIC SURGERY | Facility: CLINIC | Age: 72
End: 2022-06-10
Payer: MEDICARE

## 2022-06-10 VITALS — HEIGHT: 67 IN | WEIGHT: 174 LBS | BODY MASS INDEX: 27.31 KG/M2

## 2022-06-10 DIAGNOSIS — M76.51 PATELLAR TENDINITIS, RIGHT KNEE: ICD-10-CM

## 2022-06-10 PROCEDURE — 73562 X-RAY EXAM OF KNEE 3: CPT | Mod: RT

## 2022-06-10 PROCEDURE — 99213 OFFICE O/P EST LOW 20 MIN: CPT

## 2022-07-21 ENCOUNTER — APPOINTMENT (OUTPATIENT)
Dept: PAIN MANAGEMENT | Facility: CLINIC | Age: 72
End: 2022-07-21

## 2022-07-21 VITALS — HEIGHT: 67 IN | WEIGHT: 183 LBS | BODY MASS INDEX: 28.72 KG/M2

## 2022-07-21 PROCEDURE — 99072 ADDL SUPL MATRL&STAF TM PHE: CPT

## 2022-07-21 PROCEDURE — 99214 OFFICE O/P EST MOD 30 MIN: CPT | Mod: 25

## 2022-07-21 PROCEDURE — J3490M: CUSTOM

## 2022-07-21 PROCEDURE — 20552 NJX 1/MLT TRIGGER POINT 1/2: CPT

## 2022-07-21 NOTE — DATA REVIEWED
[MRI] : MRI [Lumbar Spine] : lumbar spine [Report was reviewed and noted in the chart] : The report was reviewed and noted in the chart [FreeTextEntry1] : 2020

## 2022-07-21 NOTE — HISTORY OF PRESENT ILLNESS
[Lower back] : lower back [Dull/Aching] : dull/aching [Sharp] : sharp [Shooting] : shooting [Frequent] : frequent [Injection therapy] : injection therapy [Standing] : standing [Walking] : walking [Bending forward] : bending forward [Exercising] : exercising [Stairs] : stairs [Lying in bed] : lying in bed [de-identified] : Back pain with radiation to RLE approx 3 months since onset, no known trigger event. Worse with lying supine. Walking aggravates it and sitting requires frequent position change. Last MRI 2020, will  obtain new MRI given new radicular component. \par \par Will repeat TPI today, last offered > 70% relief.  [] : no [FreeTextEntry6] : pressure

## 2022-07-21 NOTE — PROCEDURE
[Trigger point 1-2 muscle groups] : trigger point 1-2 muscle groups [Bilateral] : bilaterally of the [Lumbar paraspinal muscle] : lumbar paraspinal muscle [Gluteus Bob muscle] : gluteus bob muscle [Inflammation] : inflammation [Pain] : pain [Alcohol] : alcohol [Ethyl Chloride sprayed topically] : ethyl chloride sprayed topically [Sterile technique used] : sterile technique used [___ cc    1%] : Lidocaine ~Vcc of 1%  [___ cc    0.25%] : Bupivacaine (Marcaine) ~Vcc of 0.25%  [___ cc    10mg] : Triamcinolone (Kenalog) ~Vcc of 10 mg

## 2022-07-21 NOTE — PHYSICAL EXAM
[Normal Coordination] : normal coordination [Normal DTR UE/LE] : normal DTR UE/LE  [Normal Sensation] : normal sensation [Normal Mood and Affect] : normal mood and affect [Orientated] : orientated [Able to Communicate] : able to communicate [Normal Skin] : normal skin [No Rash] : no rash [No Ulcers] : no ulcers [No Lesions] : no lesions [No obvious lymphadenopathy in areas examined] : no obvious lymphadenopathy in areas examined [Well Developed] : well developed [Well Nourished] : well nourished [No Respiratory Distress] : no respiratory distress [Bending to right] : bending to right [] : mildly antalgic

## 2022-07-21 NOTE — WORK
[Other: ___] : [unfilled] [Was the competent medical cause of the injury] : was the competent medical cause of the injury [Are consistent with the injury] : are consistent with the injury [Consistent with my objective findings] : consistent with my objective findings [Total] : total [Reveals pre-existing condition(s) that may affect treatment/prognosis] : reveals pre-existing condition(s) that may affect treatment/prognosis [Cannot return to work because ________] : cannot return to work because [unfilled] [Bending/Twisting] : bending/twisting [Climbing stairs/Ladders] : climbing stairs/ladders [Kneeling] : kneeling [Lifting] : lifting [Pulling/Pushing] : pulling/pushing [Patient] : patient [Rx may affect patient's ability to return to work, make patient drowsy, or other issue] : Rx may affect patient's ability to return to work, make patient drowsy, or other issue. [I provided the services listed above] :  I provided the services listed above. 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

## 2022-11-16 ENCOUNTER — APPOINTMENT (OUTPATIENT)
Dept: PAIN MANAGEMENT | Facility: CLINIC | Age: 72
End: 2022-11-16

## 2022-11-16 VITALS — WEIGHT: 183 LBS | BODY MASS INDEX: 28.72 KG/M2 | HEIGHT: 67 IN

## 2022-11-16 DIAGNOSIS — G89.29 LOW BACK PAIN, UNSPECIFIED: ICD-10-CM

## 2022-11-16 DIAGNOSIS — M54.50 LOW BACK PAIN, UNSPECIFIED: ICD-10-CM

## 2022-11-16 DIAGNOSIS — M79.10 MYALGIA, UNSPECIFIED SITE: ICD-10-CM

## 2022-11-16 DIAGNOSIS — M54.17 RADICULOPATHY, LUMBOSACRAL REGION: ICD-10-CM

## 2022-11-16 PROCEDURE — J3490M: CUSTOM

## 2022-11-16 PROCEDURE — 99072 ADDL SUPL MATRL&STAF TM PHE: CPT

## 2022-11-16 PROCEDURE — 99214 OFFICE O/P EST MOD 30 MIN: CPT | Mod: 25

## 2022-11-16 PROCEDURE — 20552 NJX 1/MLT TRIGGER POINT 1/2: CPT

## 2022-11-16 NOTE — HISTORY OF PRESENT ILLNESS
[Lower back] : lower back [Gradual] : gradual [7] : 7 [6] : 6 [Burning] : burning [Radiating] : radiating [Shooting] : shooting [Constant] : constant [Household chores] : household chores [Leisure] : leisure [Rest] : rest [Standing] : standing [Exercising] : exercising [Stairs] : stairs [Retired] : Work status: retired [] : no

## 2022-11-16 NOTE — PROCEDURE
[Trigger point 1-2 muscle groups] : trigger point 1-2 muscle groups [Bilateral] : bilaterally of the [Lumbar paraspinal muscle] : lumbar paraspinal muscle [Gluteus Bob muscle] : gluteus bob muscle [Pain] : pain [Inflammation] : inflammation [Alcohol] : alcohol [Ethyl Chloride sprayed topically] : ethyl chloride sprayed topically [Sterile technique used] : sterile technique used [___ cc    1%] : Lidocaine ~Vcc of 1%  [___ cc    0.25%] : Bupivacaine (Marcaine) ~Vcc of 0.25%  [___ cc    10mg] : Triamcinolone (Kenalog) ~Vcc of 10 mg

## 2022-11-16 NOTE — REASON FOR VISIT
[Follow-Up Visit] : a follow-up pain management visit [FreeTextEntry2] : follow up to mri and Trigger points injectins

## 2022-11-17 ENCOUNTER — NON-APPOINTMENT (OUTPATIENT)
Age: 72
End: 2022-11-17

## 2022-12-14 ENCOUNTER — APPOINTMENT (OUTPATIENT)
Dept: ORTHOPEDIC SURGERY | Facility: CLINIC | Age: 72
End: 2022-12-14

## 2022-12-14 VITALS — WEIGHT: 180 LBS | HEIGHT: 67 IN | BODY MASS INDEX: 28.25 KG/M2

## 2022-12-14 PROCEDURE — 73562 X-RAY EXAM OF KNEE 3: CPT | Mod: RT

## 2022-12-14 PROCEDURE — 99213 OFFICE O/P EST LOW 20 MIN: CPT

## 2022-12-14 NOTE — DISCUSSION/SUMMARY
[de-identified] : Pt is s/p right revision TKR. She is functioning well. It appears that she developed a tendinitis. I reviewed his xrays with her. Patient can continue with ibuprofen and activities as tolerated. All questions answered, understanding verbalized. Patient in agreement with plan of care.\par \par I will see her back in 6 months.

## 2022-12-14 NOTE — ADDENDUM
[FreeTextEntry1] : This note was written by Milana Jade on 12/14/2022 acting as scribe for Dr. Howard Mao M.D.\par \par I, Dr. Howard Mao, have read and attest that all the information, medical decision making and discharge instructions within are true and accurate.

## 2022-12-14 NOTE — PHYSICAL EXAM
[de-identified] : General appearance: well nourished and hydrated, pleasant, alert and oriented x 3, cooperative.\par HEENT: Normocephalic, EOM intact, Nasal septum midline, Oral cavity clear, External auditory canal clear.\par Cardiovascular: no apparent abnormalities, no lower leg edema, no varicosities, pedal pulses are palpable.\par Lymphatics Lymph nodes: none palpated, Lymphedema: not present.\par Neurologic: sensation is normal, no muscle weakness in upper or lower extremities, patella tendon reflexes intact .\par Dermatologic no apparent skin lesions, moist, warm, no rash.\par Spine:cervical spine appears normal and moves freely, thoracic spine appears normal and moves freely, lumbosacral spine appears normal and moves freely.\par Gait: nonantalgic.\par \par Right Knee\par Inspection: no effusion\par Wounds: healed midline incision\par Alignment: normal.\par Palpation: no specific tenderness on palpation.\par ROM: Active (in degrees): 0-120\par Ligamentous laxity (neg): negative ant. drawer test, negative post. drawer test, stable to varus stress test, stable to valgus stress test,\par Patellofemoral Alignment Test: Q angle-, normal.\par Muscle Test: good quad strength.\par Leg examination: calf is soft and non-tender. [de-identified] : Right knee xray, AP, lateral, merchant view taken at the office today demonstrates a revision total knee replacement in satisfactory position and alignment. No evidence of loosening. The patella sits in a central position.\par \par Left knee xray merchant view taken at the office today demonstrates joint space narrowing, marginal osteophytes, sclerosis consistent with patellofemoral arthritis.

## 2022-12-14 NOTE — REASON FOR VISIT
[Follow-Up Visit] : a follow-up visit for [Total Knee Replacement Revision Surgery, Aftercare] : total knee replacement revision surgery, aftercare

## 2022-12-14 NOTE — PHYSICAL EXAM
[de-identified] : General appearance: well nourished and hydrated, pleasant, alert and oriented x 3, cooperative.\par HEENT: Normocephalic, EOM intact, Nasal septum midline, Oral cavity clear, External auditory canal clear.\par Cardiovascular: no apparent abnormalities, no lower leg edema, no varicosities, pedal pulses are palpable.\par Lymphatics Lymph nodes: none palpated, Lymphedema: not present.\par Neurologic: sensation is normal, no muscle weakness in upper or lower extremities, patella tendon reflexes intact .\par Dermatologic no apparent skin lesions, moist, warm, no rash.\par Spine:cervical spine appears normal and moves freely, thoracic spine appears normal and moves freely, lumbosacral spine appears normal and moves freely.\par Gait: nonantalgic.\par \par Right Knee\par Inspection: no effusion\par Wounds: healed midline incision\par Alignment: normal.\par Palpation: no specific tenderness on palpation.\par ROM: Active (in degrees): 0-105\par Ligamentous laxity (neg): negative ant. drawer test, negative post. drawer test, stable to varus stress test, stable to valgus stress test,\par Patellofemoral Alignment Test: Q angle-, normal.\par Muscle Test: good quad strength.\par Leg examination: calf is soft and non-tender. [de-identified] : Right knee xray, AP, lateral, merchant view taken at the office today demonstrates a revision total knee replacement in satisfactory position and alignment. No evidence of loosening. The patella sits in a central position.\par \par Left knee xray merchant view taken at the office today demonstrates joint space narrowing, marginal osteophytes, sclerosis consistent with patellofemoral arthritis

## 2022-12-14 NOTE — DISCUSSION/SUMMARY
[de-identified] : Pt is s/p right revision TKR. Her residual symptoms are muscular in nature. Patient can continue with home exercise and activities as tolerated. All questions answered, understanding verbalized. Patient in agreement with plan of care.\par \par I will see her back in 1 year with xrays.

## 2022-12-14 NOTE — HISTORY OF PRESENT ILLNESS
[de-identified] : KADEN BALDERAS is a 72 year old female who presents for follow up evaluation s/p right revision TKR for loosening at 4 months. Pt reports right knee pain and shin pain, also reports swelling. Takes ibuprofen prn.

## 2022-12-14 NOTE — HISTORY OF PRESENT ILLNESS
[de-identified] : KADEN BALDERAS is a 72 year old female who presents for follow up evaluation s/p right revision TKR. She is doing well with good motion. She is able to walk long distances, walks dog for 1m. Reports sharp pain on posterior part of joint. Denies taking any meds.

## 2022-12-14 NOTE — ADDENDUM
[FreeTextEntry1] : This note was written by Milana Jade on 06/10/2022 acting as scribe for Dr. Howard Mao M.D.\par \par I, Dr. Howard Mao, have read and attest that all the information, medical decision making and discharge instructions within are true and accurate.

## 2023-09-18 NOTE — REASON FOR VISIT
Left VM with patient to make sure they were able to get Rx's filled, asked for a call back to discuss.   [Initial Visit] : an initial visit for [Knee Pain] : knee pain

## 2023-10-06 ENCOUNTER — APPOINTMENT (OUTPATIENT)
Dept: ORTHOPEDIC SURGERY | Facility: CLINIC | Age: 73
End: 2023-10-06
Payer: MEDICARE

## 2023-10-06 PROCEDURE — 99213 OFFICE O/P EST LOW 20 MIN: CPT

## 2023-10-06 PROCEDURE — 73030 X-RAY EXAM OF SHOULDER: CPT | Mod: LT

## 2023-10-13 ENCOUNTER — NON-APPOINTMENT (OUTPATIENT)
Age: 73
End: 2023-10-13

## 2023-10-25 ENCOUNTER — APPOINTMENT (OUTPATIENT)
Dept: ORTHOPEDIC SURGERY | Facility: CLINIC | Age: 73
End: 2023-10-25
Payer: MEDICARE

## 2023-10-25 VITALS — BODY MASS INDEX: 41.66 KG/M2 | HEIGHT: 55 IN | WEIGHT: 180 LBS

## 2023-10-25 DIAGNOSIS — S49.92XA UNSPECIFIED INJURY OF LEFT SHOULDER AND UPPER ARM, INITIAL ENCOUNTER: ICD-10-CM

## 2023-10-25 PROCEDURE — 20610 DRAIN/INJ JOINT/BURSA W/O US: CPT | Mod: LT

## 2023-10-25 PROCEDURE — 99214 OFFICE O/P EST MOD 30 MIN: CPT | Mod: 25

## 2023-12-11 ENCOUNTER — APPOINTMENT (OUTPATIENT)
Dept: ORTHOPEDIC SURGERY | Facility: CLINIC | Age: 73
End: 2023-12-11
Payer: MEDICARE

## 2023-12-11 VITALS — BODY MASS INDEX: 28.25 KG/M2 | WEIGHT: 180 LBS | HEIGHT: 67 IN

## 2023-12-11 DIAGNOSIS — Z96.651 PRESENCE OF RIGHT ARTIFICIAL KNEE JOINT: ICD-10-CM

## 2023-12-11 PROCEDURE — 73562 X-RAY EXAM OF KNEE 3: CPT | Mod: RT

## 2023-12-11 PROCEDURE — 99213 OFFICE O/P EST LOW 20 MIN: CPT

## 2024-01-11 NOTE — PHYSICAL THERAPY INITIAL EVALUATION ADULT - LEVEL OF INDEPENDENCE: SIT/STAND, REHAB EVAL
Peg prep given to the patient via telephone, mailed, and MyChart.  Procedure 1-, Dx Code:z12.11 (Colon cancer screening). Patient made aware to hold Trulicity injections 7 days prior to procedure and do not take diabetic medications mornign of procedure. Patient verbalized understanding. beto De Luna  
supervision

## 2024-01-31 ENCOUNTER — APPOINTMENT (OUTPATIENT)
Dept: ORTHOPEDIC SURGERY | Facility: CLINIC | Age: 74
End: 2024-01-31
Payer: MEDICARE

## 2024-01-31 DIAGNOSIS — M17.0 BILATERAL PRIMARY OSTEOARTHRITIS OF KNEE: ICD-10-CM

## 2024-01-31 DIAGNOSIS — Z96.651 PRESENCE OF RIGHT ARTIFICIAL KNEE JOINT: ICD-10-CM

## 2024-01-31 PROCEDURE — 99212 OFFICE O/P EST SF 10 MIN: CPT

## 2024-01-31 NOTE — PHYSICAL EXAM
[de-identified] : General appearance: well-nourished and hydrated, pleasant, alert and oriented x 3, cooperative. HEENT: Normocephalic, EOM intact, Nasal septum midline, Oral cavity clear, External auditory canal clear. Cardiovascular: no apparent abnormalities, no lower leg edema, no varicosities, pedal pulses are palpable. Lymphatics Lymph nodes: none palpated, Lymphedema: not present. Neurologic: sensation is normal, no muscle weakness in upper or lower extremities, patella tendon reflexes intact. Dermatologic no apparent skin lesions, moist, warm, no rash. Spine: cervical spine appears normal and moves freely, thoracic spine appears normal and moves freely, lumbosacral spine appears normal and moves freely. Gait: nonantalgic.   Right Knee Inspection: no effusion Wounds: healed midline incision Alignment: normal. Palpation: no specific tenderness on palpation. ROM: Active (in degrees): 0-120 Ligamentous laxity (neg): negative ant. drawer test, negative post. drawer test, stable to varus stress test, stable to valgus stress test, Patellofemoral Alignment Test: Q angle-, normal. Muscle Test: good quad strength. Leg examination: calf is soft and non-tender. [de-identified] : No x-rays were taken today.

## 2024-01-31 NOTE — DISCUSSION/SUMMARY
[de-identified] : Patient is doing well following their s/p right revision TKR. She had mild soft tissue impingement symptoms as noted above. I reviewed x-rays with them and compared to prior films. I have reassured them that their implants are functioning well. Her symptoms have resolved and there are no issues.  All questions answered, understanding verbalized.   I will see them back in December for their anniversary checkup or sooner if any acute problems.

## 2024-01-31 NOTE — ADDENDUM
[FreeTextEntry1] : This note was written by Vira Dasilva on 01/31/2024 acting as scribe for Dr. Howard URIBE I, Dr. Howard Mao, have read and attest that all the information, medical decision making and discharge instructions within are true and accurate.   This note was written by Amish Smith on 01/31/2024 acting as scribe for Dr. Howard URIBE I, Dr. Howard Mao, have read and attest that all the information, medical decision making and discharge instructions within are true and accurate.

## 2024-01-31 NOTE — CONSULT LETTER
[Dear  ___] : Dear  [unfilled], [Consult Letter:] : I had the pleasure of evaluating your patient, [unfilled]. [Please see my note below.] : Please see my note below. [Consult Closing:] : Thank you very much for allowing me to participate in the care of this patient.  If you have any questions, please do not hesitate to contact me. [Sincerely,] : Sincerely, [FreeTextEntry3] : Dr. Howard Mao

## 2024-01-31 NOTE — HISTORY OF PRESENT ILLNESS
[de-identified] : KADEN BALDERAS  73 year old female presents for evaluation s/p a right revision TKR done in November 2022. She was last seen a month ago. She was doing fine until Sunday when she thinks she twisted her knee while getting up from a chair. She developed anterior knee pain that radiates down her leg. She has been using a knee brace and taking Advil prn. Today, she is asymptomatic and can walk without the brace. She is here just to make sure there is no damage to her TKR.

## 2024-06-14 NOTE — ASU PREOP CHECKLIST - HIBICLENS SHOWER 1 DATE
[facility] PSYCHIATRY EVALUATION    IDENTIFYING DATA/CHIEF COMPLAINT  Idalia Rosales is a 59 year old male who presents for counseling with a complaint of establishing care.  Today's visit was performed with the assistance of  Services.   Name of : Robin   Service used: Video: Third Party       HISTORY OF PRESENT ILLNESS   PMH of hypothyroid, DMII, HLD, HLD and PPH of panic disorder who presents to clinic to transfer care as Dr. Wilkins retired.    Patient describes living with anxiety for the past 30-40 years. Reports that he doesn't worry excessively, but rather he will have sensations of impending doom, SOB, racing heart, choking sensations, numbness in extremities that lasts for hours at a time, seemingly comes out of no where. Denies other psychiatric symptoms. Sleeps anywhere from 4-8 hours nightly depending on work schedule. Appetite maintained.     Patient says in 1988 anxiety symptoms were bad and he went to and ED, and was referred for psychiatric help. Of note, patient was also using alcohol to excess at that time.     Psychiatry started patient on xanax 0.5mg four times per day, but his symptoms were not controlled and this dose was increased to 1mg four times per day. He was able to reduce to three times per day a couple years ago.     He reports that his PCP encouraged him to stop xanax, telling him it is addicting and habit forming. Patient says he tried to come off before, but on day 3 of withdrawal, his anxiety is too bad, his body feels terrible and he needs the medication to feel like himself. He is in agreement to wean from medication today, but worries about feeling ill.    We had a long discussion about transition to Klonopin for safety, but patient was concerned about break through symptoms. We agreed to lower tablet count by 3-5 pills each visit until we can hopeful get patient to zero doses.     I introduced the idea of SSRI therapy for anxiety as  patient has never tried other medications in the past. He would like to consider this option should he need it during withdrawal.      Psychiatric Review of Systems from day of interview:      Safety: Patient denies SI/HI. No access to guns/weapons.  Feels safe at home     Depressive Symptoms: Patient denies symptoms consistent with depression prior to arrival or at this time     Manic Symptoms: Patient denies history consistent with cardinal symptoms of dave. Denies euphoria during insomnia, grandiosity, increased goal directed behaviors, flight of ideas. Not tangential; no pressured speech.     Anxiety: Patient denies symptoms of daily anxiety/generalized worry out of proportion to stressor on consistent basis. Denies flashbacks, nightmares. Denies obsessions, compulsions or rituals. Denies recent panic attacks with SOB, racing heart, diaphoresis     Psychosis: No notable impairment in concept of reality. Denies AVH. No paranoia or delusional ideation.  Denies IOR, thought broadcasting, thought insertion, thought deletion.     ADHD: no hx    ED: no hx     Past Psychiatric History:   Past Psychiatric Diagnoses: panic disorder  Current psychiatric medications: xanax 1mg TID  Previous Psychiatric Medication Trials: none  Past Seen Psychiatrists/Therapists: Dr. Wilkins  Past PHP/IOP: denies  Past Psychiatric Hospitalizations: been to ED twice 1988 and 2012 for anxiety symptoms, never admitted psychiatrically.   Suicide/Homicide History: denies SA, NSSI, HI       Substance history:  Alcohol: drank to excess every day in his youth; sober 20 years, then relapsed and was hospitalized 3-4 years ago for alcohol. Has maintained sobriety for the past 4 years.   Tobacco: quit 20 years ago  Marijuana: denies  Cocaine: denies  Opioids: denies  Other: denies    Family Psychiatric History:   Dad was alcoholic  Denies bipolar disorder, schizophrenia, completed suicide    Social History:   Relationship Status: single  Sexual  Orientation: straight  Gender Identity: male  Children: son who is 18  Living Arrangements: apartment with godmother  Support: friends  Educational Attainment/Academic Concerns: almost finished HS;   Employment:   Legal History: denies  Trauma: denies  Describe your childhood in a few words: [place] in . Reports he has 1 sister who still lives in [place]. His mom  when he was 11 and he lived with his dad for most of upbringing.     Past Medical History:   PMHx Diagnoses: see above  History of head trauma/seizures: denies         Medications:   Current Outpatient Medications   Medication Sig Dispense Refill    ALPRAZolam (XANAX) 1 MG tablet Take 1 tablet by mouth 3 times daily. Do not start before 2024. 90 tablet 2    tamsulosin (FLOMAX) 0.4 MG Cap TOME 1 C PSULA POR V A ORAL TODOS LOS D AS      albuterol 108 (90 Base) MCG/ACT inhaler       glimepiride (AMARYL) 4 MG tablet TOME HELENE TABLETA POR V A ORAL TODOS LOS D AS      levothyroxine 100 MCG tablet TOME HELENE TABLETA POR V A ORAL TODOS LOS D AS      losartan (COZAAR) 25 MG tablet TOME DOS TABLETAS POR V A ORAL TODOS LOS D AS      metformin (GLUCOPHAGE) 1000 MG tablet TOME HELENE TABLETA POR V A ORAL DOS VECES AL D A      pioglitazone (ACTOS) 30 MG tablet TOME HELENE TABLETA POR V A ORAL TODOS LOS D AS       No current facility-administered medications for this visit.       Allergies:   is allergic to penicillins.    Mental Status Exam:   APPEARANCE: 59 year old male appears stated age, good grooming/hygiene, wearing casual clothes, no acute distress  BEHAVIOR: Pleasant, calm, engaged, cooperative with interview. Good eye contact.  SPEECH: RRR, normal volume, normal tone.  Spontaneous speech without stuttering/stammering.  MOTOR: No PMR or PMA noted.  No tics/tremors or other abnormal motor movements visible.  MOOD: \"ok\"  AFFECT: Euthymic, reactive, non-labile, congruent to stated mood, full range, appropriate to situation  THOUGHT  PROCESS: Linear, logical, goal directed in conversation towards treatment  THOUGHT CONTENT: Patient denies SI/HI, paranoia, or delusions.  PERCEPTIONS: Denies AVH, does not appear to be responding to internal stimuli  INSIGHT: Fair, patient recognizes worsening mood symptoms, articulates sources of stress, understands need for treatment, connects psych symptoms to situation  JUDGMENT: Fair, patient acting on need for treatment, brought self in for psychiatric care. Consistent with follow-up, meds.  COGNITION: A and Ox3, fair concentration, with intact short and long term memory     Assessment:   59 year old male with past psychiatric history of anxiety disorder, benzodiazapine dependence, alcohol use disorder in sustained remission being seen to establish care. Biologically, patient's mood symptoms are controlled with medication regimen.  Patient is agreeable to slow wean of benzos . Psychologically, patient describes distress from psychological distress from benzo dependence.  Explicitly did not want to do therapy at this time. Socially, patient endorses strong emotional support from peers, loved ones. Currently denying any active or passive SI. Patient does not appear to be an immediate risk to harm others, self, and presents with low difficulty with self care.      DIAGNOSIS  The primary encounter diagnosis was Benzodiazepine dependence, continuous  (CMD). Diagnoses of Anxiety with somatic features and Alcohol use disorder, severe, in sustained remission  (CMD) were also pertinent to this visit.    TREATMENT PLAN     This treatment plan was collaboratively developed with the patient.    - Recommend follow up in clinic in August 16th at 10:30.     - Recommend outpatient psychotherapy on a regular basis with established provider. Patient explicitly not interested in psychotherapy at this time.    - Reduce xanax 1mg tablets from 90 tablets monthly to 87. Will continue reducing by 3-5 pills at each visit as patient  has been taking medication for over 30 years with physiological and psychological dependence. ILPDM verified. No early refills.     - Discussed with patient to call or message for any concerns or questions, including side effects or changes in behavior. Pt informed to contact family members, psychiatrist, dial 911, or seek emergent psychiatric evaluation in the occurrence of new or worsening symptoms including safety concerns such as danger to self, others, or inability to self-care. Discussed assessment and plan with patient. Pt. expressed understanding of risks, benefits, alternatives of treatment and agreed.     Psychotherapy Techniques Employed: Supportive listening, empathic validation, alliance building to which patient responded well.    60 minutes were spent on this encounter for extensive chart review (labs, notes), collaboration of care and appointment with the patient.     Jessica Aguirre, CNP   10-Nov-2020

## 2024-09-11 ENCOUNTER — APPOINTMENT (OUTPATIENT)
Dept: PAIN MANAGEMENT | Facility: CLINIC | Age: 74
End: 2024-09-11

## 2024-09-11 DIAGNOSIS — M54.17 RADICULOPATHY, LUMBOSACRAL REGION: ICD-10-CM

## 2024-09-11 DIAGNOSIS — M79.10 MYALGIA, UNSPECIFIED SITE: ICD-10-CM

## 2024-09-14 NOTE — DATA REVIEWED
[MRI] : MRI [Lumbar Spine] : lumbar spine [Report was reviewed and noted in the chart] : The report was reviewed and noted in the chart [I independently reviewed and interpreted images and report] : I independently reviewed and interpreted images and report [FreeTextEntry1] : 9/20/2022 MRI Lumbar Spine (STAND UP MRI) INTERPRETATION: At the L5-S1 disc space level, disc herniation is noted with grade 1 spondylolisthesis deforming  the thecal sac abutting the proximal S1 nerve roots bilaterally with bilateral neural foraminal  extension, right greater than left, abutting the exiting right L5 nerve root contributing to right  neural foraminal narrowing in conjunction with facet and ligamentous hypertrophy, which are  noted bilaterally. There is no evidence of bone marrow edema identified within the pars regions  bilaterally to indicate an acute process. Mild bilateral paraspinal muscular atrophy is noted. Loss  of disc signal with preservation of disc space height. At L4-5, disc herniation is noted with grade 1 spondylolisthesis deforming the thecal sac abutting  the proximal L5 nerve roots bilaterally with bilateral neural foraminal extension, right greater  than left, abutting the exiting right L4 nerve root contributing to right neural foraminal narrowing  and moderate central spinal stenosis in conjunction with bilateral facet and ligamentous  hypertrophy. There is no evidence of bone marrow edema identified within the pars regions  bilaterally to indicate an acute process. Faint signal elevation is identified within the right and  left lateral margins of the annulus fibrosus associated with annular fissure. Loss of disc signal is  noted with relative preservation of disc space height. At L3-4, disc herniation is noted with grade 1 spondylolisthesis deforming the thecal sac abutting  the proximal L4 nerve roots bilaterally with bilateral neural foraminal extension abutting the  exiting L3 nerve roots contributing to right neural foraminal narrowing and marked central spinal  stenosis in conjunction with bilateral facet and ligamentous hypertrophy. There is no evidence of  bone marrow edema identified within the pars regions bilaterally to indicate an acute process.  Loss of disc signal is noted with relative preservation of disc space height. At L2-3, disc herniation is noted with grade 1 retrolisthesis deforming the thecal sac abutting the  proximal L3 nerve roots bilaterally with bilateral inferior neural foraminal extension and mild  central spinal stenosis in conjunction with facet and ligamentous hypertrophy. Loss of disc signal  with preservation of disc space height. At L1-2, there is no evidence of herniated disc, spinal canal compromise, neural foraminal  stenosis, or loss of disc space height. Loss of disc signal is noted. At T12-L1, there is no evidence of herniated disc, spinal canal compromise, neural foraminal  stenosis, or loss of disc space height. Loss of disc signal is noted. There is only limited assessment provided of the T11-12 and T10-11 disc spaces at the peripheral  margin of the included field of view. Hemangioma is noted within the T11 vertebral body. Lumbar spine curvature is noted with rightward convexity. There is no evidence of lumbar vertebral body compression fracture. There is no evidence of  bone marrow infiltrative disorder. There is no evidence of signal abnormality within the conus  medullaris which is located at the approximate L1 vertebral body level. There is only limited assessment provided of abdominal structures. Previously identified  nonspecific round signal focus within the right hepatic lobe currently measures approximately  3.2 x 2.1 cm representing interval decrease in size compared to previous measurement of 5.1 x  3.6 cm. Differential diagnosis would include cyst or hemangioma. Given interval decrease in size  compared to prior exam, alternative differential diagnostic possibility of neoplastic etiology  would be considered less likely. A second approximately 0.6 cm round signal focus is also  identified more superiorly within the right hepatic lobe. Examination is compared to previous MRI study dated 07/28/2020. L2-3 disc herniation with  grade 1 retrolisthesis and mild central spinal stenosis represents mild progression of previously  identified L2-3 disc bulge. The age of above findings is otherwise indeterminate. IMPRESSION: - L5-S1, L4-5, L3-4, and L2-3 disc herniations deforming the thecal sac abutting the  proximal nerve roots bilaterally with bilateral neural foraminal extension also noted at  each level abutting the exiting right-sided nerve roots at L4-5 and L5-S1, abutting the  exiting L3 nerve roots bilaterally at L3-4, L3-4-L5-S1 right neural foraminal narrowing,  L2-3-L4-5 central spinal stenosis (marked at L3-4, moderate at L4-5, and mild at L2-3) in  conjunction with facet and ligamentous hypertrophy. - Lumbar spine curvature with rightward convexity. - Limited assessment provided of abdominal structures. Interval decrease in size of  previously identified nonspecific round signal focus right hepatic lobe currently  measuring approximately 3.2 x 2.1 cm compared to previous measurement of 5.1 x 3.6  cm. A second 0.6 cm signal focus is noted within the right hepatic lobe more superiorly.  Differential diagnostic etiologic considerations include cyst or hemangioma. Correlation  with follow-up sonography is recommended for additional evaluation.

## 2024-09-14 NOTE — DISCUSSION/SUMMARY
[de-identified] : WC - DOI 2/23/2012  KADEN BALDERAS is a 74 year-old woman presenting for a NPV ([facility]) for a history of chronic low back pain.   Plan: 1) MRI lumbar spine images reviewed with the patient.

## 2024-09-14 NOTE — HISTORY OF PRESENT ILLNESS
[FreeTextEntry1] : WC - DOI 2/23/2012 9/11/2024: KADEN BALDERAS is a 74 year-old woman presenting for a NPV ([facility]) for a history of chronic low back pain.    The patient states that average pain over the past week was /10 in severity. Mood: Sleep:   Prior treatment: TPI

## 2024-12-11 ENCOUNTER — APPOINTMENT (OUTPATIENT)
Dept: ORTHOPEDIC SURGERY | Facility: CLINIC | Age: 74
End: 2024-12-11

## 2024-12-20 ENCOUNTER — APPOINTMENT (OUTPATIENT)
Dept: ORTHOPEDIC SURGERY | Facility: CLINIC | Age: 74
End: 2024-12-20
Payer: MEDICARE

## 2024-12-20 VITALS — HEIGHT: 67 IN | BODY MASS INDEX: 28.25 KG/M2 | WEIGHT: 180 LBS

## 2024-12-20 DIAGNOSIS — Z96.651 PRESENCE OF RIGHT ARTIFICIAL KNEE JOINT: ICD-10-CM

## 2024-12-20 DIAGNOSIS — M21.162 VARUS DEFORMITY, NOT ELSEWHERE CLASSIFIED, LEFT KNEE: ICD-10-CM

## 2024-12-20 DIAGNOSIS — M17.0 BILATERAL PRIMARY OSTEOARTHRITIS OF KNEE: ICD-10-CM

## 2024-12-20 PROCEDURE — G2211 COMPLEX E/M VISIT ADD ON: CPT

## 2024-12-20 PROCEDURE — 73562 X-RAY EXAM OF KNEE 3: CPT | Mod: RT

## 2024-12-20 PROCEDURE — 73564 X-RAY EXAM KNEE 4 OR MORE: CPT | Mod: LT

## 2024-12-20 PROCEDURE — 99215 OFFICE O/P EST HI 40 MIN: CPT

## 2025-01-22 ENCOUNTER — OUTPATIENT (OUTPATIENT)
Dept: OUTPATIENT SERVICES | Facility: HOSPITAL | Age: 75
LOS: 1 days | Discharge: HOME | End: 2025-01-22
Payer: MEDICARE

## 2025-01-22 VITALS
WEIGHT: 187.61 LBS | HEIGHT: 67 IN | RESPIRATION RATE: 17 BRPM | HEART RATE: 71 BPM | OXYGEN SATURATION: 96 % | TEMPERATURE: 98 F

## 2025-01-22 DIAGNOSIS — Z87.39 PERSONAL HISTORY OF OTHER DISEASES OF THE MUSCULOSKELETAL SYSTEM AND CONNECTIVE TISSUE: Chronic | ICD-10-CM

## 2025-01-22 DIAGNOSIS — M17.12 UNILATERAL PRIMARY OSTEOARTHRITIS, LEFT KNEE: ICD-10-CM

## 2025-01-22 DIAGNOSIS — Z98.890 OTHER SPECIFIED POSTPROCEDURAL STATES: Chronic | ICD-10-CM

## 2025-01-22 DIAGNOSIS — Z01.818 ENCOUNTER FOR OTHER PREPROCEDURAL EXAMINATION: ICD-10-CM

## 2025-01-22 DIAGNOSIS — Z96.651 PRESENCE OF RIGHT ARTIFICIAL KNEE JOINT: Chronic | ICD-10-CM

## 2025-01-22 LAB
A1C WITH ESTIMATED AVERAGE GLUCOSE RESULT: 5.8 % — HIGH (ref 4–5.6)
ALBUMIN SERPL ELPH-MCNC: 3.7 G/DL — SIGNIFICANT CHANGE UP (ref 3.3–5)
ALP SERPL-CCNC: 72 U/L — SIGNIFICANT CHANGE UP (ref 40–120)
ALT FLD-CCNC: 22 U/L — SIGNIFICANT CHANGE UP (ref 12–78)
ANION GAP SERPL CALC-SCNC: 4 MMOL/L — LOW (ref 5–17)
APPEARANCE UR: CLEAR — SIGNIFICANT CHANGE UP
APTT BLD: 32.9 SEC — SIGNIFICANT CHANGE UP (ref 24.5–35.6)
AST SERPL-CCNC: 17 U/L — SIGNIFICANT CHANGE UP (ref 15–37)
BASOPHILS # BLD AUTO: 0.05 K/UL — SIGNIFICANT CHANGE UP (ref 0–0.2)
BASOPHILS NFR BLD AUTO: 1 % — SIGNIFICANT CHANGE UP (ref 0–2)
BILIRUB SERPL-MCNC: 0.6 MG/DL — SIGNIFICANT CHANGE UP (ref 0.2–1.2)
BILIRUB UR-MCNC: NEGATIVE — SIGNIFICANT CHANGE UP
BUN SERPL-MCNC: 13 MG/DL — SIGNIFICANT CHANGE UP (ref 7–23)
CALCIUM SERPL-MCNC: 9.6 MG/DL — SIGNIFICANT CHANGE UP (ref 8.5–10.1)
CHLORIDE SERPL-SCNC: 108 MMOL/L — SIGNIFICANT CHANGE UP (ref 96–108)
CO2 SERPL-SCNC: 28 MMOL/L — SIGNIFICANT CHANGE UP (ref 22–31)
COLOR SPEC: YELLOW — SIGNIFICANT CHANGE UP
CREAT SERPL-MCNC: 0.78 MG/DL — SIGNIFICANT CHANGE UP (ref 0.5–1.3)
DIFF PNL FLD: NEGATIVE — SIGNIFICANT CHANGE UP
EGFR: 81 ML/MIN/1.73M2 — SIGNIFICANT CHANGE UP
EOSINOPHIL # BLD AUTO: 0.16 K/UL — SIGNIFICANT CHANGE UP (ref 0–0.5)
EOSINOPHIL NFR BLD AUTO: 3.2 % — SIGNIFICANT CHANGE UP (ref 0–6)
ESTIMATED AVERAGE GLUCOSE: 120 MG/DL — HIGH (ref 68–114)
GLUCOSE SERPL-MCNC: 94 MG/DL — SIGNIFICANT CHANGE UP (ref 70–99)
GLUCOSE UR QL: NEGATIVE MG/DL — SIGNIFICANT CHANGE UP
HCT VFR BLD CALC: 41.3 % — SIGNIFICANT CHANGE UP (ref 34.5–45)
HGB BLD-MCNC: 13.5 G/DL — SIGNIFICANT CHANGE UP (ref 11.5–15.5)
IMM GRANULOCYTES NFR BLD AUTO: 0.2 % — SIGNIFICANT CHANGE UP (ref 0–0.9)
INR BLD: 0.89 RATIO — SIGNIFICANT CHANGE UP (ref 0.85–1.16)
KETONES UR-MCNC: NEGATIVE MG/DL — SIGNIFICANT CHANGE UP
LEUKOCYTE ESTERASE UR-ACNC: NEGATIVE — SIGNIFICANT CHANGE UP
LYMPHOCYTES # BLD AUTO: 1.44 K/UL — SIGNIFICANT CHANGE UP (ref 1–3.3)
LYMPHOCYTES # BLD AUTO: 28.9 % — SIGNIFICANT CHANGE UP (ref 13–44)
MCHC RBC-ENTMCNC: 30.6 PG — SIGNIFICANT CHANGE UP (ref 27–34)
MCHC RBC-ENTMCNC: 32.7 G/DL — SIGNIFICANT CHANGE UP (ref 32–36)
MCV RBC AUTO: 93.7 FL — SIGNIFICANT CHANGE UP (ref 80–100)
MONOCYTES # BLD AUTO: 0.48 K/UL — SIGNIFICANT CHANGE UP (ref 0–0.9)
MONOCYTES NFR BLD AUTO: 9.6 % — SIGNIFICANT CHANGE UP (ref 2–14)
MRSA PCR RESULT.: SIGNIFICANT CHANGE UP
NEUTROPHILS # BLD AUTO: 2.85 K/UL — SIGNIFICANT CHANGE UP (ref 1.8–7.4)
NEUTROPHILS NFR BLD AUTO: 57.1 % — SIGNIFICANT CHANGE UP (ref 43–77)
NITRITE UR-MCNC: NEGATIVE — SIGNIFICANT CHANGE UP
NRBC # BLD: 0 /100 WBCS — SIGNIFICANT CHANGE UP (ref 0–0)
NRBC BLD-RTO: 0 /100 WBCS — SIGNIFICANT CHANGE UP (ref 0–0)
PH UR: 6.5 — SIGNIFICANT CHANGE UP (ref 5–8)
PLATELET # BLD AUTO: 306 K/UL — SIGNIFICANT CHANGE UP (ref 150–400)
POTASSIUM SERPL-MCNC: 4.4 MMOL/L — SIGNIFICANT CHANGE UP (ref 3.5–5.3)
POTASSIUM SERPL-SCNC: 4.4 MMOL/L — SIGNIFICANT CHANGE UP (ref 3.5–5.3)
PROT SERPL-MCNC: 7.3 GM/DL — SIGNIFICANT CHANGE UP (ref 6–8.3)
PROT UR-MCNC: NEGATIVE MG/DL — SIGNIFICANT CHANGE UP
PROTHROM AB SERPL-ACNC: 10.1 SEC — SIGNIFICANT CHANGE UP (ref 9.9–13.4)
RBC # BLD: 4.41 M/UL — SIGNIFICANT CHANGE UP (ref 3.8–5.2)
RBC # FLD: 13.3 % — SIGNIFICANT CHANGE UP (ref 10.3–14.5)
S AUREUS DNA NOSE QL NAA+PROBE: SIGNIFICANT CHANGE UP
SODIUM SERPL-SCNC: 140 MMOL/L — SIGNIFICANT CHANGE UP (ref 135–145)
SP GR SPEC: 1.01 — SIGNIFICANT CHANGE UP (ref 1–1.03)
UROBILINOGEN FLD QL: 0.2 MG/DL — SIGNIFICANT CHANGE UP (ref 0.2–1)
VIT D25+D1,25 OH+D1,25 PNL SERPL-MCNC: 49 PG/ML — SIGNIFICANT CHANGE UP (ref 19.9–79.3)
WBC # BLD: 4.99 K/UL — SIGNIFICANT CHANGE UP (ref 3.8–10.5)
WBC # FLD AUTO: 4.99 K/UL — SIGNIFICANT CHANGE UP (ref 3.8–10.5)

## 2025-01-22 PROCEDURE — 93010 ELECTROCARDIOGRAM REPORT: CPT

## 2025-01-22 NOTE — H&P PST ADULT - ASSESSMENT
left knee osteoarthritis   CAPRINI SCORE    AGE RELATED RISK FACTORS                                                             [ ] Age 41-60 years                                            (1 Point)  [x ] Age: 61-74 years                                           (2 Points)                 [ ] Age= 75 years                                                (3 Points)             DISEASE RELATED RISK FACTORS                                                       [ ] Edema in the lower extremities                 (1 Point)                     [ ] Varicose veins                                               (1 Point)                                 [x ] BMI > 25 Kg/m2                                            (1 Point)                                  [ ] Serious infection (ie PNA)                            (1 Point)                     [ ] Lung disease ( COPD, Emphysema)            (1 Point)                                                                          [ ] Acute myocardial infarction                         (1 Point)                  [ ] Congestive heart failure (in the previous month)  (1 Point)         [ ] Inflammatory bowel disease                            (1 Point)                  [ ] Central venous access, PICC or Port               (2 points)       (within the last month)                                                                [ ] Stroke (in the previous month)                        (5 Points)    [ ] Previous or present malignancy                       (2 points)                                                                                                                                                         HEMATOLOGY RELATED FACTORS                                                         [ ] Prior episodes of VTE                                     (3 Points)                     [ ] Positive family history for VTE                      (3 Points)                  [ ] Prothrombin 68573 A                                     (3 Points)                     [ ] Factor V Leiden                                                (3 Points)                        [ ] Lupus anticoagulants                                      (3 Points)                                                           [ ] Anticardiolipin antibodies                              (3 Points)                                                       [ ] High homocysteine in the blood                   (3 Points)                                             [ ] Other congenital or acquired thrombophilia      (3 Points)                                                [ ] Heparin induced thrombocytopenia                  (3 Points)                                        MOBILITY RELATED FACTORS  [ ] Bed rest                                                         (1 Point)  [ ] Plaster cast                                                    (2 points)  [ ] Bed bound for more than 72 hours           (2 Points)    GENDER SPECIFIC FACTORS  [ ] Pregnancy or had a baby within the last month   (1 Point)  [ ] Post-partum < 6 weeks                                   (1 Point)  [ ] Hormonal therapy  or oral contraception   (1 Point)  [ ] History of pregnancy complications              (1 point)  [ ] Unexplained or recurrent              (1 Point)    OTHER RISK FACTORS                                           (1 Point)  [ ] BMI >40, smoking, diabetes requiring insulin, chemotherapy  blood transfusions and length of surgery over 2 hours    SURGERY RELATED RISK FACTORS  [ ]  Section within the last month     (1 Point)  [ ] Minor surgery                                                  (1 Point)  [ ] Arthroscopic surgery                                       (2 Points)  [ ] Planned major surgery lasting more            (2 Points)      than 45 minutes     [x ] Elective hip or knee joint replacement       (5 points)       surgery                                                TRAUMA RELATED RISK FACTORS  [ ] Fracture of the hip, pelvis, or leg                       (5 Points)  [ ] Spinal cord injury resulting in paralysis             (5 points)       (in the previous month)    [ ] Paralysis  (less than 1 month)                             (5 Points)  [ ] Multiple Trauma within 1 month                        (5 Points)    Total Score [    8    ]    Caprini Score 0-2: Low Risk, NO VTE prophylaxis required for most patients, encourage ambulation  Caprini Score 3-6: Moderate Risk , pharmacologic VTE prophylaxis is indicated for most patients (in the absence of contraindications)  Caprini Score Greater than or =7: High risk, pharmocologic VTE prophylaxis indicated for most patients (in the absence of contraindications)

## 2025-01-22 NOTE — H&P PST ADULT - HISTORY OF PRESENT ILLNESS
75 yo female c/o left knee pain 2/2 osteoarthritis - scheduled for  left knee arthroplasty  goal: to walk without pain   denies recent travels in the past 30 days. No fever, SOB, cough, flu like symptoms or body rash- covid screen

## 2025-01-22 NOTE — PHYSICAL THERAPY INITIAL EVALUATION ADULT - NSPTDMEREC_GEN_A_CORE
Reports owns a rolling walker, SC, and [employer]. Needs B axillary crutches. Reports owns a rolling walker, SC, and commode./axillary crutches

## 2025-01-22 NOTE — PHYSICAL THERAPY INITIAL EVALUATION ADULT - ADDITIONAL COMMENTS
Pt lives in a private house with her spouse and a 40lb dog. There are 3 stair steps to enter without handrails to enter at front. Patient plans to stay at main level of house during recovery. Bathroom is a walk in shower with grab bars, with a comfort height toilet seat, with both fixed & retractable shower heads.  Reports owns a rolling walker, SC, and commode. Patient is right-handed and drives; wears glasses always. Patient denies falls in past 6 months. Occasional buckling. Patient endorses typical pain at best is 4/10, at worst is 8/10. Per patient, pain is worse with prolonged standing/walking. Does not take pain medications or use any ice/heat for pain and rests as needed.

## 2025-01-22 NOTE — PHYSICAL THERAPY INITIAL EVALUATION ADULT - PERTINENT HX OF CURRENT PROBLEM, REHAB EVAL
Chronic pain in  L knee affecting functional mobility.  L TKA pending for 2/4/2025. Height: 5'7 Weight: 180 lb

## 2025-01-22 NOTE — H&P PST ADULT - NSICDXPASTSURGICALHX_GEN_ALL_CORE_FT
PAST SURGICAL HISTORY:  H/O arthroscopy of knee x3 right    H/O arthroscopy of shoulder left shoulder x2    H/O foot surgery right foot (neuroma)    H/O thumb surgery     H/O total knee replacement, right 2016    History of skin surgery lip    History of trigger finger     S/P shoulder surgery

## 2025-01-23 LAB
CULTURE RESULTS: SIGNIFICANT CHANGE UP
SPECIMEN SOURCE: SIGNIFICANT CHANGE UP

## 2025-01-23 RX ORDER — BACTERIOSTATIC SODIUM CHLORIDE 0.9 %
3 VIAL (ML) INJECTION EVERY 8 HOURS
Refills: 0 | Status: DISCONTINUED | OUTPATIENT
Start: 2025-02-04 | End: 2025-02-04

## 2025-01-24 RX ORDER — OXYCODONE HYDROCHLORIDE 30 MG/1
5 TABLET ORAL
Refills: 0 | Status: DISCONTINUED | OUTPATIENT
Start: 2025-02-04 | End: 2025-02-05

## 2025-01-24 RX ORDER — SODIUM CHLORIDE 9 G/ML
1000 INJECTION, SOLUTION INTRAVENOUS
Refills: 0 | Status: DISCONTINUED | OUTPATIENT
Start: 2025-02-04 | End: 2025-02-05

## 2025-01-24 RX ORDER — ACETAMINOPHEN 160 MG/5ML
1000 SUSPENSION ORAL ONCE
Refills: 0 | Status: COMPLETED | OUTPATIENT
Start: 2025-02-04 | End: 2025-02-05

## 2025-01-24 RX ORDER — OXYCODONE HYDROCHLORIDE 30 MG/1
10 TABLET ORAL
Refills: 0 | Status: DISCONTINUED | OUTPATIENT
Start: 2025-02-04 | End: 2025-02-05

## 2025-01-24 RX ORDER — PANTOPRAZOLE 20 MG/1
40 TABLET, DELAYED RELEASE ORAL
Refills: 0 | Status: DISCONTINUED | OUTPATIENT
Start: 2025-02-04 | End: 2025-02-05

## 2025-01-24 RX ORDER — MECOBAL/LEVOMEFOLAT CA/B6 PHOS 2-3-35 MG
1 TABLET ORAL DAILY
Refills: 0 | Status: DISCONTINUED | OUTPATIENT
Start: 2025-02-04 | End: 2025-02-05

## 2025-01-24 RX ORDER — HYDROMORPHONE HYDROCHLORIDE 4 MG/ML
0.5 INJECTION, SOLUTION INTRAMUSCULAR; INTRAVENOUS; SUBCUTANEOUS
Refills: 0 | Status: DISCONTINUED | OUTPATIENT
Start: 2025-02-04 | End: 2025-02-05

## 2025-01-24 RX ORDER — POLYETHYLENE GLYCOL 3350 17 G/17G
17 POWDER, FOR SOLUTION ORAL AT BEDTIME
Refills: 0 | Status: DISCONTINUED | OUTPATIENT
Start: 2025-02-04 | End: 2025-02-05

## 2025-01-24 RX ORDER — APIXABAN 5 MG/1
2.5 TABLET, FILM COATED ORAL EVERY 12 HOURS
Refills: 0 | Status: DISCONTINUED | OUTPATIENT
Start: 2025-02-05 | End: 2025-02-05

## 2025-01-24 RX ORDER — FOLIC ACID 1 MG
1 TABLET ORAL DAILY
Refills: 0 | Status: DISCONTINUED | OUTPATIENT
Start: 2025-02-04 | End: 2025-02-05

## 2025-01-24 RX ORDER — MAGNESIUM HYDROXIDE 400 MG/5ML
30 SUSPENSION, ORAL (FINAL DOSE FORM) ORAL DAILY
Refills: 0 | Status: DISCONTINUED | OUTPATIENT
Start: 2025-02-04 | End: 2025-02-05

## 2025-01-24 RX ORDER — MAGNESIUM, ALUMINUM HYDROXIDE 200-225/5
30 SUSPENSION, ORAL (FINAL DOSE FORM) ORAL
Refills: 0 | Status: DISCONTINUED | OUTPATIENT
Start: 2025-02-04 | End: 2025-02-05

## 2025-01-24 RX ORDER — CELECOXIB 200 MG
200 CAPSULE ORAL EVERY 12 HOURS
Refills: 0 | Status: DISCONTINUED | OUTPATIENT
Start: 2025-02-04 | End: 2025-02-05

## 2025-01-24 RX ORDER — TRAMADOL HYDROCHLORIDE 100 MG/1
50 TABLET, EXTENDED RELEASE ORAL EVERY 6 HOURS
Refills: 0 | Status: DISCONTINUED | OUTPATIENT
Start: 2025-02-04 | End: 2025-02-05

## 2025-01-24 RX ORDER — SENNOSIDES 8.6 MG
2 TABLET ORAL AT BEDTIME
Refills: 0 | Status: DISCONTINUED | OUTPATIENT
Start: 2025-02-04 | End: 2025-02-05

## 2025-01-24 RX ORDER — ONDANSETRON 4 MG/1
4 TABLET, ORALLY DISINTEGRATING ORAL EVERY 6 HOURS
Refills: 0 | Status: DISCONTINUED | OUTPATIENT
Start: 2025-02-04 | End: 2025-02-05

## 2025-01-24 RX ORDER — ACETAMINOPHEN 160 MG/5ML
1000 SUSPENSION ORAL EVERY 8 HOURS
Refills: 0 | Status: DISCONTINUED | OUTPATIENT
Start: 2025-02-04 | End: 2025-02-05

## 2025-02-04 ENCOUNTER — RESULT REVIEW (OUTPATIENT)
Age: 75
End: 2025-02-04

## 2025-02-04 ENCOUNTER — INPATIENT (INPATIENT)
Facility: HOSPITAL | Age: 75
LOS: 0 days | Discharge: HOME HEALTH | End: 2025-02-05
Attending: ORTHOPAEDIC SURGERY | Admitting: ORTHOPAEDIC SURGERY
Payer: MEDICARE

## 2025-02-04 ENCOUNTER — APPOINTMENT (OUTPATIENT)
Dept: ORTHOPEDIC SURGERY | Facility: HOSPITAL | Age: 75
End: 2025-02-04

## 2025-02-04 ENCOUNTER — TRANSCRIPTION ENCOUNTER (OUTPATIENT)
Age: 75
End: 2025-02-04

## 2025-02-04 VITALS
DIASTOLIC BLOOD PRESSURE: 79 MMHG | RESPIRATION RATE: 16 BRPM | OXYGEN SATURATION: 97 % | SYSTOLIC BLOOD PRESSURE: 129 MMHG | HEIGHT: 67 IN | WEIGHT: 187.61 LBS | HEART RATE: 78 BPM | TEMPERATURE: 98 F

## 2025-02-04 DIAGNOSIS — Z98.890 OTHER SPECIFIED POSTPROCEDURAL STATES: Chronic | ICD-10-CM

## 2025-02-04 DIAGNOSIS — Z96.651 PRESENCE OF RIGHT ARTIFICIAL KNEE JOINT: Chronic | ICD-10-CM

## 2025-02-04 DIAGNOSIS — Z87.39 PERSONAL HISTORY OF OTHER DISEASES OF THE MUSCULOSKELETAL SYSTEM AND CONNECTIVE TISSUE: Chronic | ICD-10-CM

## 2025-02-04 LAB
ANION GAP SERPL CALC-SCNC: 5 MMOL/L — SIGNIFICANT CHANGE UP (ref 5–17)
BUN SERPL-MCNC: 15 MG/DL — SIGNIFICANT CHANGE UP (ref 7–23)
CALCIUM SERPL-MCNC: 9.2 MG/DL — SIGNIFICANT CHANGE UP (ref 8.5–10.1)
CHLORIDE SERPL-SCNC: 108 MMOL/L — SIGNIFICANT CHANGE UP (ref 96–108)
CO2 SERPL-SCNC: 24 MMOL/L — SIGNIFICANT CHANGE UP (ref 22–31)
CREAT SERPL-MCNC: 0.73 MG/DL — SIGNIFICANT CHANGE UP (ref 0.5–1.3)
EGFR: 87 ML/MIN/1.73M2 — SIGNIFICANT CHANGE UP
GLUCOSE BLDC GLUCOMTR-MCNC: 86 MG/DL — SIGNIFICANT CHANGE UP (ref 70–99)
GLUCOSE SERPL-MCNC: 134 MG/DL — HIGH (ref 70–99)
HCT VFR BLD CALC: 39.2 % — SIGNIFICANT CHANGE UP (ref 34.5–45)
HGB BLD-MCNC: 12.9 G/DL — SIGNIFICANT CHANGE UP (ref 11.5–15.5)
MCHC RBC-ENTMCNC: 30.3 PG — SIGNIFICANT CHANGE UP (ref 27–34)
MCHC RBC-ENTMCNC: 32.9 G/DL — SIGNIFICANT CHANGE UP (ref 32–36)
MCV RBC AUTO: 92 FL — SIGNIFICANT CHANGE UP (ref 80–100)
NRBC # BLD: 0 /100 WBCS — SIGNIFICANT CHANGE UP (ref 0–0)
NRBC BLD-RTO: 0 /100 WBCS — SIGNIFICANT CHANGE UP (ref 0–0)
PLATELET # BLD AUTO: 325 K/UL — SIGNIFICANT CHANGE UP (ref 150–400)
POTASSIUM SERPL-MCNC: 3.7 MMOL/L — SIGNIFICANT CHANGE UP (ref 3.5–5.3)
POTASSIUM SERPL-SCNC: 3.7 MMOL/L — SIGNIFICANT CHANGE UP (ref 3.5–5.3)
RBC # BLD: 4.26 M/UL — SIGNIFICANT CHANGE UP (ref 3.8–5.2)
RBC # FLD: 13.2 % — SIGNIFICANT CHANGE UP (ref 10.3–14.5)
SODIUM SERPL-SCNC: 137 MMOL/L — SIGNIFICANT CHANGE UP (ref 135–145)
WBC # BLD: 9.98 K/UL — SIGNIFICANT CHANGE UP (ref 3.8–10.5)
WBC # FLD AUTO: 9.98 K/UL — SIGNIFICANT CHANGE UP (ref 3.8–10.5)

## 2025-02-04 PROCEDURE — 27447 TOTAL KNEE ARTHROPLASTY: CPT | Mod: LT

## 2025-02-04 PROCEDURE — 73560 X-RAY EXAM OF KNEE 1 OR 2: CPT | Mod: 26,LT

## 2025-02-04 DEVICE — ZIMMER/NEXGEN HEX HEAD SCREW 3.5MM: Type: IMPLANTABLE DEVICE | Site: LEFT | Status: FUNCTIONAL

## 2025-02-04 DEVICE — IMPLANTABLE DEVICE: Type: IMPLANTABLE DEVICE | Site: LEFT | Status: FUNCTIONAL

## 2025-02-04 DEVICE — STEM TIB PSN 5 DEG SZF L: Type: IMPLANTABLE DEVICE | Site: LEFT | Status: FUNCTIONAL

## 2025-02-04 DEVICE — FEM PERSONA PS CMT CCR NRW SZ 11 L: Type: IMPLANTABLE DEVICE | Site: LEFT | Status: FUNCTIONAL

## 2025-02-04 DEVICE — CEMENT PALACOS R: Type: IMPLANTABLE DEVICE | Site: LEFT | Status: FUNCTIONAL

## 2025-02-04 DEVICE — ZIMMER FEMALE HEX SCREW MAGNETIC 2.5MM X 25MM: Type: IMPLANTABLE DEVICE | Site: LEFT | Status: FUNCTIONAL

## 2025-02-04 DEVICE — ZIMMER/NEXGEN SMOOTH PIN 3.2X75MM: Type: IMPLANTABLE DEVICE | Site: LEFT | Status: FUNCTIONAL

## 2025-02-04 DEVICE — STEM EXT PERSONA 14MM PLUS 30M: Type: IMPLANTABLE DEVICE | Site: LEFT | Status: FUNCTIONAL

## 2025-02-04 DEVICE — PATELLA  ALL POLY VE 32MM: Type: IMPLANTABLE DEVICE | Site: LEFT | Status: FUNCTIONAL

## 2025-02-04 RX ORDER — HYDROMORPHONE HYDROCHLORIDE 4 MG/ML
0.5 INJECTION, SOLUTION INTRAMUSCULAR; INTRAVENOUS; SUBCUTANEOUS
Refills: 0 | Status: DISCONTINUED | OUTPATIENT
Start: 2025-02-04 | End: 2025-02-04

## 2025-02-04 RX ORDER — APREPITANT 40 MG/1
40 CAPSULE ORAL ONCE
Refills: 0 | Status: COMPLETED | OUTPATIENT
Start: 2025-02-04 | End: 2025-02-04

## 2025-02-04 RX ORDER — ONDANSETRON 4 MG/1
4 TABLET, ORALLY DISINTEGRATING ORAL ONCE
Refills: 0 | Status: DISCONTINUED | OUTPATIENT
Start: 2025-02-04 | End: 2025-02-04

## 2025-02-04 RX ORDER — METOCLOPRAMIDE 10 MG/1
10 TABLET ORAL ONCE
Refills: 0 | Status: COMPLETED | OUTPATIENT
Start: 2025-02-04 | End: 2025-02-04

## 2025-02-04 RX ORDER — CEFAZOLIN SODIUM IN 0.9 % NACL 2 G/10 ML
2000 SYRINGE (ML) INTRAVENOUS EVERY 8 HOURS
Refills: 0 | Status: COMPLETED | OUTPATIENT
Start: 2025-02-04 | End: 2025-02-05

## 2025-02-04 RX ORDER — CELECOXIB 200 MG
200 CAPSULE ORAL ONCE
Refills: 0 | Status: COMPLETED | OUTPATIENT
Start: 2025-02-04 | End: 2025-02-04

## 2025-02-04 RX ORDER — ACETAMINOPHEN 160 MG/5ML
650 SUSPENSION ORAL ONCE
Refills: 0 | Status: COMPLETED | OUTPATIENT
Start: 2025-02-04 | End: 2025-02-04

## 2025-02-04 RX ORDER — SODIUM CHLORIDE 9 G/ML
1000 INJECTION, SOLUTION INTRAVENOUS
Refills: 0 | Status: DISCONTINUED | OUTPATIENT
Start: 2025-02-04 | End: 2025-02-04

## 2025-02-04 RX ADMIN — METOCLOPRAMIDE 10 MILLIGRAM(S): 10 TABLET ORAL at 22:25

## 2025-02-04 RX ADMIN — Medication 200 MILLIGRAM(S): at 10:24

## 2025-02-04 RX ADMIN — Medication 100 MILLIGRAM(S): at 19:31

## 2025-02-04 RX ADMIN — Medication 200 MILLIGRAM(S): at 17:09

## 2025-02-04 RX ADMIN — SODIUM CHLORIDE 125 MILLILITER(S): 9 INJECTION, SOLUTION INTRAVENOUS at 15:32

## 2025-02-04 RX ADMIN — SODIUM CHLORIDE 100 MILLILITER(S): 9 INJECTION, SOLUTION INTRAVENOUS at 14:00

## 2025-02-04 RX ADMIN — ACETAMINOPHEN 650 MILLIGRAM(S): 160 SUSPENSION ORAL at 10:25

## 2025-02-04 RX ADMIN — ONDANSETRON 4 MILLIGRAM(S): 4 TABLET, ORALLY DISINTEGRATING ORAL at 19:31

## 2025-02-04 RX ADMIN — OXYCODONE HYDROCHLORIDE 10 MILLIGRAM(S): 30 TABLET ORAL at 18:00

## 2025-02-04 RX ADMIN — APREPITANT 40 MILLIGRAM(S): 40 CAPSULE ORAL at 10:25

## 2025-02-04 RX ADMIN — Medication 200 MILLIGRAM(S): at 18:00

## 2025-02-04 RX ADMIN — OXYCODONE HYDROCHLORIDE 10 MILLIGRAM(S): 30 TABLET ORAL at 17:09

## 2025-02-04 NOTE — DISCHARGE NOTE PROVIDER - HOSPITAL COURSE
Hospital Course:  The patient is a 74yFemale status post elective Total Knee Arthroplasty to the Left knee after failing outpatient nonoperative conservative management. Patient presented to [facility] after being medically cleared for an elective surgical procedure. The patient was taken to the operating room on date mentioned above. Prophylactic antibiotics were started before the procedure and continued for 24 hours. There were no complications during the procedure and patient tolerated the procedure well. The patient was transferred to the recovery room in stable condition and subsequently to the surgical floor. The patient was placed on Eliquis 2.5mg BID for anticoagulation while in house to continue until POD 14, then starting POD 15 patient instructed to start ASA 81mg PO BID for a total of 30 days. All home medications were continued. The patient received physical therapy daily and daily labs were followed. The dressing was kept clean, dry, intact. The rest of the hospital stay was unremarkable.

## 2025-02-04 NOTE — DISCHARGE NOTE PROVIDER - CARE PROVIDER_API CALL
Howard Mao  Joint Reconstruction  [address], [address]  [place] [address]  Phone: [phone]  Fax: [phone]  Follow Up Time:

## 2025-02-04 NOTE — PHYSICAL THERAPY INITIAL EVALUATION ADULT - LEVEL OF INDEPENDENCE: GAIT, REHAB EVAL
Rx Authorization:    Requested Medication/ Dose: Carbidopa- Levodopa 25/100 tab    Date last refill ordered: 2/25/2019    Quantity ordered: 270    # refills: 3    Date of last clinic visit with ordering provider: 9/16/2019    Date of next clinic visit with ordering provider: 1/13/2020    All pertinent protocol data (lab date/result):     Include pertinent information from patients message:      supervision

## 2025-02-04 NOTE — PHYSICAL THERAPY INITIAL EVALUATION ADULT - RANGE OF MOTION EXAMINATION, REHAB EVAL
L knee ROM flexion 65 degrees (pt very hesitant to bend it), Extension - 10 degrees./Right LE ROM was WFL (within functional limits)/deficits as listed below

## 2025-02-04 NOTE — PATIENT PROFILE ADULT - FALL HARM RISK - HARM RISK INTERVENTIONS

## 2025-02-04 NOTE — DISCHARGE NOTE PROVIDER - NSDCFUSCHEDAPPT_GEN_ALL_CORE_FT
Howard Mao [facility]  ORTHOSURG [address]  Scheduled Appointment: 02/04/2025    Howard Mao [facility]  ORTHOSURPAULIE [address]  Scheduled Appointment: 02/26/2025     Howard Mao  [facility]  ORTHOSUR [address]  Scheduled Appointment: 02/26/2025

## 2025-02-04 NOTE — DISCHARGE NOTE PROVIDER - NSDCFUADDINST_GEN_ALL_CORE_FT
1.	Pain Control  2.	Walking with full weight bearing as tolerated, with assistive devices (walker/Cane as Needed)  3.	DVT Prophylaxis, Eliquis 2.5 mg twice daily for 14 days. Day 15 start Aspirin 81 mg twice a day for 30 days. do not skip doses.  4.	PT as needed  5.	Please call the office and make an appointment for staple removal in 21 days. [phone]  6.	Remove Dressing Post-Op Day 10-14, with Dry dressing and Daily Dressing Changes as Need.  7.	Ice/Elevate affected area as Needed  8.	Keep Dressing Clean and dry.

## 2025-02-04 NOTE — PHYSICAL THERAPY INITIAL EVALUATION ADULT - GENERAL OBSERVATIONS, REHAB EVAL
Chart reviewed, pt encountered on supine, AxOx4, cooperative,  Ha at bedside, OT Logan present. + IV (disconnected before session)

## 2025-02-04 NOTE — OCCUPATIONAL THERAPY INITIAL EVALUATION ADULT - GENERAL OBSERVATIONS, REHAB EVAL
Pt encountered semi supine in bed, NAD, all lines intact, pt reported pain 4/10 s/p Left TKR, pt agreeable to OT eval, PT Johann present, spouse at bedside.

## 2025-02-04 NOTE — PHYSICAL THERAPY INITIAL EVALUATION ADULT - PLANNED THERAPY INTERVENTIONS, PT EVAL
To be able to perform stair negotiation with B Crutches  device and no hand rails Independently to improve access and exiting from home and access to bedrooms, basement and bathrooms by 2 weeks/balance training/bed mobility training/gait training/ROM/strengthening/transfer training

## 2025-02-04 NOTE — PHYSICAL THERAPY INITIAL EVALUATION ADULT - ACTIVE RANGE OF MOTION EXAMINATION, REHAB EVAL
L knee ROM flexion 65 degrees (pt very hesitant to bend it), Extension - 10 degrees/Right LE Active ROM was WFL (within functional limits)/deficits as listed below

## 2025-02-04 NOTE — CONSULT NOTE ADULT - SUBJECTIVE AND OBJECTIVE BOX
Chief Complaint:  Patient is a 74y old  Female who presents with a chief complaint of L TKA (04 Feb 2025 08:47)      HPI: Currently no SOB or chest pain or palpitation. No current cough or fever . No current nausea or vomiting . Voiding . Pain controlled .      Review of Systems:    General:  No wt loss, fevers, chills, night sweats  Eyes:  Good vision, no reported pain  ENT:  No sore throat, pain, runny nose, dysphagia  CV:  No pain, palpitations, hypo/hypertension  Resp:  No dyspnea, cough, tachypnea, wheezing  GI:  No pain, nausea, vomiting, diarrhea, constipation  :  No pain, bleeding, incontinence, nocturia  Muscle:  No pain, weakness  Breast:  No pain, abscess, mass, discharge  Neuro:  No weakness, tingling, memory problems  Psych:  No fatigue, insomnia, mood problems, depression  Endocrine:  No polyuria, polydypsia, cold/heat intolerance  Heme:  No petechiae, ecchymosis, easy bruisability  Skin:  No rash, tattoos, scars, edema    Relevant Family History: Heart disease . Allergy : PCN.      Relevant Social History: NC.     PMH : AS. Hyperlipidemia. H/O Right knee replacement. Meds : Reviewed.    Physical Exam:      Vital Signs:  Vital Signs Last 24 Hrs  T(C): 36.6 (04 Feb 2025 17:43), Max: 36.7 (04 Feb 2025 13:30)  T(F): 97.8 (04 Feb 2025 17:43), Max: 98.1 (04 Feb 2025 13:30)  HR: 77 (04 Feb 2025 19:30) (77 - 95)  BP: 116/76 (04 Feb 2025 19:30) (100/64 - 129/79)  BP(mean): --  RR: 20 (04 Feb 2025 19:30) (12 - 20)  SpO2: 96% (04 Feb 2025 19:30) (93% - 97%)    Parameters below as of 04 Feb 2025 19:30  Patient On (Oxygen Delivery Method): room air      Daily Height in cm: 170.18 (04 Feb 2025 09:54)    Daily   I&O's Summary    04 Feb 2025 07:01  -  04 Feb 2025 21:00  --------------------------------------------------------  IN: 1375 mL / OUT: 0 mL / NET: 1375 mL        General:  Appears stated age, well-groomed, well-nourished, no distress  HEENT:  NC/AT, patent nares w/ pink mucosa, OP clear w/o lesions, PERRL, EOMI, conjunctivae clear, no thyromegaly, nodules, adenopathy, no JVD  Chest:  Full & symmetric excursion, no increased effort, breath sounds clear  Cardiovascular:  Regular rhythm, S1, S2, + ES murmur/rub/S3/S4, no carotid/femoral/abdominal bruit, radial/pedal pulses 2+, no edema  Abdomen:  Soft, non-tender, non-distended, normoactive bowel sounds, no HSM  Extremities: Slight fulness left knee. + Pulse .  Skin:  No rash/erythema/ecchymoses/petechiae/wounds/abscess/warm/dry  Musculoskeletal: Intact .  Neuro/Psych:  Alert, oriented, normal and symmetric strength in UEs, LEs .    Laboratory:                            12.9   9.98  )-----------( 325      ( 04 Feb 2025 13:50 )             39.2     02-04    137  |  108  |  15  ----------------------------<  134[H]  3.7   |  24  |  0.73    Ca    9.2      04 Feb 2025 13:50            CAPILLARY BLOOD GLUCOSE      POCT Blood Glucose.: 86 mg/dL (04 Feb 2025 09:52)        Urinalysis Basic - ( 04 Feb 2025 13:50 )    Color: x / Appearance: x / SG: x / pH: x  Gluc: 134 mg/dL / Ketone: x  / Bili: x / Urobili: x   Blood: x / Protein: x / Nitrite: x   Leuk Esterase: x / RBC: x / WBC x   Sq Epi: x / Non Sq Epi: x / Bacteria: x        Imaging:      Assessment: S/P Left Knee Replacement. AS. Hyperlipidemia.       Plan: PT/OT. Pain control. DVT prophylaxis. Incentive spirometry. Fall precaution.

## 2025-02-04 NOTE — PHYSICAL THERAPY INITIAL EVALUATION ADULT - CRITERIA FOR SKILLED THERAPEUTIC INTERVENTIONS
home with home PT/impairments found/functional limitations in following categories/risk reduction/prevention/rehab potential/predicted duration of therapy intervention/anticipated equipment needs at discharge/anticipated discharge recommendation

## 2025-02-04 NOTE — DISCHARGE NOTE PROVIDER - NSDCMRMEDTOKEN_GEN_ALL_CORE_FT
aspirin 81 mg oral tablet, chewable: 1 tab(s) chewed 2 times a day take twice per day after completion of Eliquis  CeleBREX 200 mg oral capsule: 1 cap(s) orally once a day MDD: 1  Eliquis 2.5 mg oral tablet: 1 tab(s) orally 2 times a day take twice a day for 2 weeks after surgery MDD: 2  naloxone 4 mg/0.1 mL nasal spray: 4 milligram(s) intranasally  oxyCODONE 5 mg oral tablet: 1 tab(s) orally every 4 hours as needed for -for moderate- severe pain MDD: 6

## 2025-02-04 NOTE — PATIENT PROFILE ADULT - NSTRANSFERBELONGINGSRESP_GEN_A_NUR
Preventive Health Recommendations  Male Ages 40 to 49    Yearly exam:             See your health care provider every year in order to  o   Review health changes.   o   Discuss preventive care.    o   Review your medicines if your doctor has prescribed any.    You should be tested each year for STDs (sexually transmitted diseases) if you re at risk.     Have a cholesterol test every 5 years.     Have a colonoscopy (test for colon cancer) if someone in your family has had colon cancer or polyps before age 50.     After age 45, have a diabetes test (fasting glucose). If you are at risk for diabetes, you should have this test every 3 years.      Talk with your health care provider about whether or not a prostate cancer screening test (PSA) is right for you.    Shots: Get a flu shot each year. Get a tetanus shot every 10 years.     Nutrition:    Eat at least 5 servings of fruits and vegetables daily.     Eat whole-grain bread, whole-wheat pasta and brown rice instead of white grains and rice.     Get adequate Calcium and Vitamin D.     Lifestyle    Exercise for at least 150 minutes a week (30 minutes a day, 5 days a week). This will help you control your weight and prevent disease.     Limit alcohol to one drink per day.     No smoking.     Wear sunscreen to prevent skin cancer.     See your dentist every six months for an exam and cleaning.       yes

## 2025-02-04 NOTE — PHYSICAL THERAPY INITIAL EVALUATION ADULT - IMPAIRMENTS CONTRIBUTING TO GAIT DEVIATIONS, PT EVAL
01-31    138  |  105  |  20  ----------------------------<  102<H>  3.4<L>   |  23  |  1.0    Ca    7.6<L>      31 Jan 2023 05:52  Phos  2.2     01-31  Mg     2.0     01-31    TPro  4.8<L>  /  Alb  2.6<L>  /  TBili  0.6  /  DBili  x   /  AST  19  /  ALT  16  /  AlkPhos  122<H>  01-31  POCT Blood Glucose.: 122 mg/dL (01-31-23 @ 22:47)  A1C with Estimated Average Glucose Result: 6.1 % (01-30-23 @ 06:00)   pain/impaired postural control/decreased ROM/decreased strength

## 2025-02-04 NOTE — OCCUPATIONAL THERAPY INITIAL EVALUATION ADULT - ADDITIONAL COMMENTS
Pt lives in a private house with her spouse and a 40lb dog. There are 3 stair steps to enter without handrails to enter at front. Patient plans to stay at main level of house during recovery. Bathroom is a walk in shower with grab bars, with a comfort height toilet seat, with both fixed & retractable shower heads.  Reports owns a rolling walker, SC, and commode. Patient is right-handed and drives; wears glasses always.

## 2025-02-05 ENCOUNTER — TRANSCRIPTION ENCOUNTER (OUTPATIENT)
Age: 75
End: 2025-02-05

## 2025-02-05 VITALS
SYSTOLIC BLOOD PRESSURE: 114 MMHG | HEART RATE: 68 BPM | TEMPERATURE: 98 F | DIASTOLIC BLOOD PRESSURE: 70 MMHG | RESPIRATION RATE: 17 BRPM | OXYGEN SATURATION: 96 %

## 2025-02-05 LAB
ANION GAP SERPL CALC-SCNC: 6 MMOL/L — SIGNIFICANT CHANGE UP (ref 5–17)
BUN SERPL-MCNC: 20 MG/DL — SIGNIFICANT CHANGE UP (ref 7–23)
CALCIUM SERPL-MCNC: 8.8 MG/DL — SIGNIFICANT CHANGE UP (ref 8.5–10.1)
CHLORIDE SERPL-SCNC: 107 MMOL/L — SIGNIFICANT CHANGE UP (ref 96–108)
CO2 SERPL-SCNC: 25 MMOL/L — SIGNIFICANT CHANGE UP (ref 22–31)
CREAT SERPL-MCNC: 0.8 MG/DL — SIGNIFICANT CHANGE UP (ref 0.5–1.3)
EGFR: 78 ML/MIN/1.73M2 — SIGNIFICANT CHANGE UP
GLUCOSE SERPL-MCNC: 107 MG/DL — HIGH (ref 70–99)
HCT VFR BLD CALC: 34.1 % — LOW (ref 34.5–45)
HGB BLD-MCNC: 11.2 G/DL — LOW (ref 11.5–15.5)
MCHC RBC-ENTMCNC: 30.4 PG — SIGNIFICANT CHANGE UP (ref 27–34)
MCHC RBC-ENTMCNC: 32.8 G/DL — SIGNIFICANT CHANGE UP (ref 32–36)
MCV RBC AUTO: 92.7 FL — SIGNIFICANT CHANGE UP (ref 80–100)
NRBC # BLD: 0 /100 WBCS — SIGNIFICANT CHANGE UP (ref 0–0)
NRBC BLD-RTO: 0 /100 WBCS — SIGNIFICANT CHANGE UP (ref 0–0)
PLATELET # BLD AUTO: 275 K/UL — SIGNIFICANT CHANGE UP (ref 150–400)
POTASSIUM SERPL-MCNC: 4.1 MMOL/L — SIGNIFICANT CHANGE UP (ref 3.5–5.3)
POTASSIUM SERPL-SCNC: 4.1 MMOL/L — SIGNIFICANT CHANGE UP (ref 3.5–5.3)
RBC # BLD: 3.68 M/UL — LOW (ref 3.8–5.2)
RBC # FLD: 13.2 % — SIGNIFICANT CHANGE UP (ref 10.3–14.5)
SODIUM SERPL-SCNC: 138 MMOL/L — SIGNIFICANT CHANGE UP (ref 135–145)
WBC # BLD: 12.31 K/UL — HIGH (ref 3.8–10.5)
WBC # FLD AUTO: 12.31 K/UL — HIGH (ref 3.8–10.5)

## 2025-02-05 RX ORDER — OXYCODONE HYDROCHLORIDE 30 MG/1
1 TABLET ORAL
Qty: 30 | Refills: 0
Start: 2025-02-05 | End: 2025-02-09

## 2025-02-05 RX ORDER — CELECOXIB 200 MG
1 CAPSULE ORAL
Qty: 30 | Refills: 0
Start: 2025-02-05 | End: 2025-03-06

## 2025-02-05 RX ORDER — NALOXONE HYDROCHLORIDE 3 MG/.1ML
4 SPRAY NASAL
Qty: 1 | Refills: 0
Start: 2025-02-05

## 2025-02-05 RX ORDER — APIXABAN 5 MG/1
1 TABLET, FILM COATED ORAL
Qty: 28 | Refills: 0
Start: 2025-02-05 | End: 2025-02-18

## 2025-02-05 RX ORDER — ASPIRIN 81 MG/1
1 TABLET, COATED ORAL
Qty: 60 | Refills: 0
Start: 2025-02-05 | End: 2025-03-06

## 2025-02-05 RX ADMIN — ACETAMINOPHEN 1000 MILLIGRAM(S): 160 SUSPENSION ORAL at 06:54

## 2025-02-05 RX ADMIN — Medication 200 MILLIGRAM(S): at 06:55

## 2025-02-05 RX ADMIN — Medication 1 TABLET(S): at 12:42

## 2025-02-05 RX ADMIN — TRAMADOL HYDROCHLORIDE 50 MILLIGRAM(S): 100 TABLET, EXTENDED RELEASE ORAL at 10:50

## 2025-02-05 RX ADMIN — ACETAMINOPHEN 1000 MILLIGRAM(S): 160 SUSPENSION ORAL at 13:47

## 2025-02-05 RX ADMIN — PANTOPRAZOLE 40 MILLIGRAM(S): 20 TABLET, DELAYED RELEASE ORAL at 06:53

## 2025-02-05 RX ADMIN — ONDANSETRON 4 MILLIGRAM(S): 4 TABLET, ORALLY DISINTEGRATING ORAL at 04:30

## 2025-02-05 RX ADMIN — TRAMADOL HYDROCHLORIDE 50 MILLIGRAM(S): 100 TABLET, EXTENDED RELEASE ORAL at 11:50

## 2025-02-05 RX ADMIN — ACETAMINOPHEN 1000 MILLIGRAM(S): 160 SUSPENSION ORAL at 07:50

## 2025-02-05 RX ADMIN — Medication 100 MILLIGRAM(S): at 03:25

## 2025-02-05 RX ADMIN — APIXABAN 2.5 MILLIGRAM(S): 5 TABLET, FILM COATED ORAL at 08:05

## 2025-02-05 RX ADMIN — Medication 1 MILLIGRAM(S): at 12:42

## 2025-02-05 RX ADMIN — ACETAMINOPHEN 400 MILLIGRAM(S): 160 SUSPENSION ORAL at 12:41

## 2025-02-05 RX ADMIN — Medication 200 MILLIGRAM(S): at 07:50

## 2025-02-05 NOTE — DISCHARGE NOTE NURSING/CASE MANAGEMENT/SOCIAL WORK - NSDCPEFALRISK_GEN_ALL_CORE
For information on Fall & Injury Prevention, visit: [email or web address] OR  [email or web address] OR  https://www.cdc.gov/steadi/patient.html

## 2025-02-05 NOTE — DISCHARGE NOTE NURSING/CASE MANAGEMENT/SOCIAL WORK - NSDCPEELIQUISREACT_GEN_ALL_CORE
-- DO NOT REPLY / DO NOT REPLY ALL --  -- Message is from the [facility]--    Patient is requesting a medication refill - medication is on active medication list    Patient is currently OUT of the requested medication.    Was Medication Pended?  Yes.    Rx Name and Dose:  amLODIPine (NORVASC) 5 MG tablet    Duration: 90 days    Pharmacy  [id number] - [place], [place] - [address]    Patient confirmed the above pharmacy as correct?  Yes    Caller Information       Type Contact Phone    11/22/2020 11:06 AM CST Phone (Incoming) Victorino Bullock (Self) [phone] (H)          Alternative phone number: n/a    Turnaround time given to caller:   \"Your doctor's office is closed. This message will be sent to our nurse. They will return your call as soon as they review your message. (Calls after 10 PM will be returned tomorrow morning.)\"   Apixaban/Eliquis increases your risk for bleeding. Notify your doctor if you experience any of the following side effects: bleeding, coughing or vomiting blood, red or black stool, unexpected pain or swelling, itching or hives, chest pain, chest tightness, trouble breathing, changes in how much or how often you urinate, red or pink urine, numbness or tingling in your feet, or unusual muscle weakness. When Apixaban/Eliquis is taken with other medicines, they can affect how it works. Taking other medications such as aspirin, blood thinners, nonsteroidal anti-inflammatories, and medications that treat depression can increase your risk of bleeding. It is very important to tell your health care provider about all of the other medicines, including over-the-counter medications, herbs, and vitamins you are taking. DO NOT start, stop, or change the dosage of any medicine, including over-the-counter medicines, vitamins, and herbal products without your doctor’s approval. Any products containing aspirin or are nonsteroidal anti-inflammatories lessen the blood’s ability to form clots and add to the effect of Apixaban/Eliquis. Never take aspirin or medicines that contain aspirin without speaking to your doctor.

## 2025-02-05 NOTE — PROGRESS NOTE ADULT - SUBJECTIVE AND OBJECTIVE BOX
Patient seen and examined at bedside. Pain is controlled. Patient denies any numbness, tingling, weakness, or any other orthopaedic complaint.    LABS:                        12.9   9.98  )-----------( 325      ( 04 Feb 2025 13:50 )             39.2     02-04    137  |  108  |  15  ----------------------------<  134[H]  3.7   |  24  |  0.73    Ca    9.2      04 Feb 2025 13:50        Urinalysis Basic - ( 04 Feb 2025 13:50 )    Color: x / Appearance: x / SG: x / pH: x  Gluc: 134 mg/dL / Ketone: x  / Bili: x / Urobili: x   Blood: x / Protein: x / Nitrite: x   Leuk Esterase: x / RBC: x / WBC x   Sq Epi: x / Non Sq Epi: x / Bacteria: x        VITAL SIGNS:  T(C): 36.6 (02-04-25 @ 17:43), Max: 36.7 (02-04-25 @ 13:30)  HR: 77 (02-04-25 @ 19:30) (77 - 95)  BP: 116/76 (02-04-25 @ 19:30) (100/64 - 129/79)  RR: 20 (02-04-25 @ 19:30) (12 - 20)  SpO2: 96% (02-04-25 @ 19:30) (93% - 97%)    Gen: Resting in bed, no acute distress  LLE  Dressing in place, clean/dry/intact.   Keerthi-incisional tenderness to palpation; otherwise, NTTP throughout the rest of the extremity.   SILT L2-S1.  GSC/TA/EHL/FHL intact. Q/H unable to assess 2' pain.   DP pulse palpable.   No calf tenderness bilaterally.   Compartments soft and compressible.             Assessment and Plan:  74y Female POD0 L TKA    Follow up postoperative labs  WBAT/PT/OT  Pain management PRN  Continue prophylactic antibiotics  DVT PPx: hold until POD1, Cyn  Incentive spirometry  Dispo: Home  Will discuss with Dr. Mao and advise of any changes to the plan. 
Patient seen and examined at bedside. Pain well controlled with medication. Patient denies any numbness, tingling, weakness, or any other orthopaedic complaint. Denies N/V/CP/SOB.    LABS:                        12.9   9.98  )-----------( 325      ( 04 Feb 2025 13:50 )             39.2     02-04    137  |  108  |  15  ----------------------------<  134[H]  3.7   |  24  |  0.73    Ca    9.2      04 Feb 2025 13:50        Urinalysis Basic - ( 04 Feb 2025 13:50 )    Color: x / Appearance: x / SG: x / pH: x  Gluc: 134 mg/dL / Ketone: x  / Bili: x / Urobili: x   Blood: x / Protein: x / Nitrite: x   Leuk Esterase: x / RBC: x / WBC x   Sq Epi: x / Non Sq Epi: x / Bacteria: x        VITAL SIGNS:  T(C): 36.4 (02-05-25 @ 03:30), Max: 36.7 (02-04-25 @ 13:30)  HR: 69 (02-05-25 @ 03:30) (69 - 95)  BP: 111/64 (02-05-25 @ 03:30) (100/64 - 129/79)  RR: 17 (02-05-25 @ 03:30) (12 - 20)  SpO2: 94% (02-05-25 @ 03:30) (93% - 97%)    Gen: Resting in bed, no acute distress  LLE  Dressing in place, clean/dry/intact.   Keerthi-incisional tenderness to palpation; otherwise, NTTP throughout the rest of the extremity.   SILT L2-S1.  GSC/TA/EHL/FHL intact. Q/H unable to assess 2' pain.   DP pulse palpable.   No calf tenderness bilaterally.   Compartments soft and compressible.             Assessment and Plan:  74y Female POD1 L TKA    Follow up postoperative labs  WBAT/PT/OT  Pain management PRN  Continue prophylactic antibiotics  DVT PPx: hold until POD1, Cyn  Incentive spirometry  Dispo: Home  Will discuss with Dr. Mao and advise of any changes to the plan.

## 2025-02-05 NOTE — DISCHARGE NOTE NURSING/CASE MANAGEMENT/SOCIAL WORK - PATIENT PORTAL LINK FT
You can access the FollowMyHealth Patient Portal offered by [facility] by registering at the following website: [email or web address]. By joining "[email or web address]’s FollowMyHealth portal, you will also be able to view your health information using other applications (apps) compatible with our system.

## 2025-02-05 NOTE — DISCHARGE NOTE NURSING/CASE MANAGEMENT/SOCIAL WORK - FINANCIAL ASSISTANCE
[facility] provides services at a reduced cost to those who are determined to be eligible through [facility]’s financial assistance program. Information regarding [facility]’s financial assistance program can be found by going to [email or web address] or by calling [phone].

## 2025-02-10 RX ORDER — OXYCODONE 5 MG/1
5 TABLET ORAL
Qty: 40 | Refills: 0 | Status: ACTIVE | COMMUNITY
Start: 2025-02-10 | End: 1900-01-01

## 2025-02-11 DIAGNOSIS — Z96.651 PRESENCE OF RIGHT ARTIFICIAL KNEE JOINT: ICD-10-CM

## 2025-02-11 DIAGNOSIS — E78.5 HYPERLIPIDEMIA, UNSPECIFIED: ICD-10-CM

## 2025-02-11 DIAGNOSIS — M17.12 UNILATERAL PRIMARY OSTEOARTHRITIS, LEFT KNEE: ICD-10-CM

## 2025-02-11 DIAGNOSIS — J45.909 UNSPECIFIED ASTHMA, UNCOMPLICATED: ICD-10-CM

## 2025-02-11 DIAGNOSIS — I35.0 NONRHEUMATIC AORTIC (VALVE) STENOSIS: ICD-10-CM

## 2025-02-11 DIAGNOSIS — Z88.0 ALLERGY STATUS TO PENICILLIN: ICD-10-CM

## 2025-02-21 PROBLEM — Z96.652 STATUS POST TOTAL LEFT KNEE REPLACEMENT: Status: ACTIVE | Noted: 2025-02-21

## 2025-02-21 RX ORDER — OXYCODONE 5 MG/1
5 TABLET ORAL
Qty: 40 | Refills: 0 | Status: ACTIVE | COMMUNITY
Start: 2025-02-21 | End: 1900-01-01

## 2025-02-26 ENCOUNTER — APPOINTMENT (OUTPATIENT)
Dept: ORTHOPEDIC SURGERY | Facility: CLINIC | Age: 75
End: 2025-02-26
Payer: MEDICARE

## 2025-02-26 DIAGNOSIS — Z96.652 PRESENCE OF LEFT ARTIFICIAL KNEE JOINT: ICD-10-CM

## 2025-02-26 PROCEDURE — 99024 POSTOP FOLLOW-UP VISIT: CPT

## 2025-03-10 ENCOUNTER — APPOINTMENT (OUTPATIENT)
Dept: ORTHOPEDIC SURGERY | Facility: CLINIC | Age: 75
End: 2025-03-10
Payer: MEDICARE

## 2025-03-10 VITALS — WEIGHT: 180 LBS | BODY MASS INDEX: 28.93 KG/M2 | HEIGHT: 66 IN

## 2025-03-10 DIAGNOSIS — Z96.652 PRESENCE OF LEFT ARTIFICIAL KNEE JOINT: ICD-10-CM

## 2025-03-10 DIAGNOSIS — M17.0 BILATERAL PRIMARY OSTEOARTHRITIS OF KNEE: ICD-10-CM

## 2025-03-10 PROCEDURE — 99024 POSTOP FOLLOW-UP VISIT: CPT

## 2025-03-10 PROCEDURE — 73562 X-RAY EXAM OF KNEE 3: CPT | Mod: LT

## 2025-03-10 RX ORDER — METHYLPREDNISOLONE 4 MG/1
4 TABLET ORAL
Qty: 1 | Refills: 0 | Status: ACTIVE | COMMUNITY
Start: 2025-03-10 | End: 1900-01-01

## 2025-04-28 ENCOUNTER — APPOINTMENT (OUTPATIENT)
Dept: ORTHOPEDIC SURGERY | Facility: CLINIC | Age: 75
End: 2025-04-28
Payer: MEDICARE

## 2025-04-28 VITALS — HEIGHT: 66 IN | BODY MASS INDEX: 28.93 KG/M2 | WEIGHT: 180 LBS

## 2025-04-28 DIAGNOSIS — Z96.652 PRESENCE OF LEFT ARTIFICIAL KNEE JOINT: ICD-10-CM

## 2025-04-28 DIAGNOSIS — M17.0 BILATERAL PRIMARY OSTEOARTHRITIS OF KNEE: ICD-10-CM

## 2025-04-28 PROCEDURE — 99024 POSTOP FOLLOW-UP VISIT: CPT

## 2025-04-28 PROCEDURE — 73562 X-RAY EXAM OF KNEE 3: CPT | Mod: LT

## 2025-06-23 ENCOUNTER — APPOINTMENT (OUTPATIENT)
Dept: ORTHOPEDIC SURGERY | Facility: CLINIC | Age: 75
End: 2025-06-23
Payer: MEDICARE

## 2025-06-23 ENCOUNTER — NON-APPOINTMENT (OUTPATIENT)
Age: 75
End: 2025-06-23

## 2025-06-23 VITALS — BODY MASS INDEX: 28.93 KG/M2 | HEIGHT: 66 IN | WEIGHT: 180 LBS

## 2025-06-23 PROBLEM — Z96.653 PRESENCE OF ARTIFICIAL KNEE JOINT, BILATERAL: Status: ACTIVE | Noted: 2025-06-23

## 2025-06-23 PROCEDURE — 73562 X-RAY EXAM OF KNEE 3: CPT | Mod: LT

## 2025-06-23 PROCEDURE — 99213 OFFICE O/P EST LOW 20 MIN: CPT

## 2025-06-23 PROCEDURE — G2211 COMPLEX E/M VISIT ADD ON: CPT

## (undated) DEVICE — SYR LUER LOK 50CC

## (undated) DEVICE — SAW SAGITTAL 2108 SERIES 20.5X1.27X85MM

## (undated) DEVICE — GLV 9 PROTEXIS (WHITE)

## (undated) DEVICE — NDL HYPO SAFE 18G X 1.5" (PINK)

## (undated) DEVICE — MEDICATION LABELS W MARKER

## (undated) DEVICE — SYR LUER LOK 10CC

## (undated) DEVICE — ZIMMER BLADE PATELLA REAMER W PILOT HOLE SZ 32

## (undated) DEVICE — DRAPE STERI-DRAPE INCISE 32X33"

## (undated) DEVICE — ZIMMER MIXING BOWL WITH SPATULA

## (undated) DEVICE — HOOD T5 PEELAWAY

## (undated) DEVICE — CRYO/CUFF GRAVITY COOLER KNEE LARGE

## (undated) DEVICE — DRSG AQUACEL 3.5 X 14"

## (undated) DEVICE — ELCTR STRYKER NEPTUNE SMOKE EVACUATION PENCIL (GREEN)

## (undated) DEVICE — ZIMMER PULSAVAC PLUS FAN KIT

## (undated) DEVICE — STAPLER SKIN MULTI DIRECTION W35

## (undated) DEVICE — DRAPE TOWEL BLUE 17" X 24"

## (undated) DEVICE — FRA-ESU BOVIE FORCE TRIAD T6D04548DX: Type: DURABLE MEDICAL EQUIPMENT

## (undated) DEVICE — BLADE SURGICAL #15 CARBON

## (undated) DEVICE — DRAPE 3/4 SHEET W REINFORCEMENT 56X77"

## (undated) DEVICE — DRSG ACE BANDAGE 6"

## (undated) DEVICE — GLV 9 PROTEXIS ORTHO (BROWN)

## (undated) DEVICE — HOOD FLYTE STRYKER HELMET SHIELD

## (undated) DEVICE — DRSG 4 X 4" 4PLY STERILE

## (undated) DEVICE — DRSG COMBINE 5 X 9"

## (undated) DEVICE — DRAPE STERI-DRAPE INCISE 19X17"

## (undated) DEVICE — DRAIN JACKSON PRATT 3 SPRING RESERVOIR W 10FR PVC DRAIN

## (undated) DEVICE — FRA-TOURNIQUET 402010120012: Type: DURABLE MEDICAL EQUIPMENT

## (undated) DEVICE — POSITIONER FOAM BUMP FLAT TOP 10X6X4" LRG

## (undated) DEVICE — DRAPE INSTRUMENT POUCH 6.75" X 11"

## (undated) DEVICE — DRAPE MAYO STAND 23"

## (undated) DEVICE — DRAPE SHOWER CURTAIN ISOLATION

## (undated) DEVICE — SUT VICRYL PLUS 0 18" CT-1 UNDYED (POP-OFF)

## (undated) DEVICE — SUT VICRYL 2-0 27" CT-2 UNDYED

## (undated) DEVICE — TOURNIQUET ESMARK 6"

## (undated) DEVICE — BLADE SURGICAL #20 CARBON

## (undated) DEVICE — GOWN XXL

## (undated) DEVICE — PACK TOTAL KNEE

## (undated) DEVICE — ELCTR GROUNDING PAD ADULT COVIDIEN

## (undated) DEVICE — DRSG WEBRIL 6"

## (undated) DEVICE — SUCTION YANKAUER NO CONTROL VENT

## (undated) DEVICE — ZIMMER BLADE PATELLA REAMER 35MM

## (undated) DEVICE — NDL HYPO SAFE 22G X 1.5" (BLACK)